# Patient Record
Sex: MALE | Race: BLACK OR AFRICAN AMERICAN | Employment: FULL TIME | URBAN - METROPOLITAN AREA
[De-identification: names, ages, dates, MRNs, and addresses within clinical notes are randomized per-mention and may not be internally consistent; named-entity substitution may affect disease eponyms.]

---

## 2017-11-09 ENCOUNTER — TELEPHONE (OUTPATIENT)
Dept: INTERNAL MEDICINE CLINIC | Age: 27
End: 2017-11-09

## 2017-11-09 ENCOUNTER — TELEPHONE (OUTPATIENT)
Dept: FAMILY MEDICINE CLINIC | Age: 27
End: 2017-11-09

## 2017-11-09 NOTE — TELEPHONE ENCOUNTER
The patient is scheduled with Jaelyn Velazquez 11/14/17, he called and wanted to be seen today. He was offered an appt at 2:15 today at [facility] office and declined because of transportation issues. He then asked for an appt here this morning. He was offered an 11:30 with 11:00 arrival time today per Dr. Johnny Obrien and he said he couldn't make that either. He was given Denice's office # to call and see if he could get in sooner or maybe be called if there were any cancellations. Again, he was offered 2 appts today and declined both.

## 2017-11-09 NOTE — TELEPHONE ENCOUNTER
Pt notified that he would not be able to be seen in the office. Recommended that he go to the [facility]. Pt was given the information for the [facility].

## 2017-11-14 ENCOUNTER — OFFICE VISIT (OUTPATIENT)
Dept: INTERNAL MEDICINE CLINIC | Age: 27
End: 2017-11-14

## 2017-11-14 VITALS
BODY MASS INDEX: 23.49 KG/M2 | HEART RATE: 60 BPM | DIASTOLIC BLOOD PRESSURE: 78 MMHG | RESPIRATION RATE: 16 BRPM | HEIGHT: 71 IN | OXYGEN SATURATION: 98 % | SYSTOLIC BLOOD PRESSURE: 120 MMHG | WEIGHT: 167.8 LBS

## 2017-11-14 DIAGNOSIS — M54.42 CHRONIC BILATERAL LOW BACK PAIN WITH LEFT-SIDED SCIATICA: Primary | ICD-10-CM

## 2017-11-14 DIAGNOSIS — Z76.89 ENCOUNTER TO ESTABLISH CARE: ICD-10-CM

## 2017-11-14 DIAGNOSIS — Z11.4 SCREENING FOR HIV WITHOUT PRESENCE OF RISK FACTORS: ICD-10-CM

## 2017-11-14 DIAGNOSIS — Z13.0 SCREENING, ANEMIA, DEFICIENCY, IRON: ICD-10-CM

## 2017-11-14 DIAGNOSIS — Z13.29 SCREENING FOR THYROID DISORDER: ICD-10-CM

## 2017-11-14 DIAGNOSIS — G89.29 CHRONIC BILATERAL LOW BACK PAIN WITH LEFT-SIDED SCIATICA: Primary | ICD-10-CM

## 2017-11-14 DIAGNOSIS — Z13.228 SCREENING FOR METABOLIC DISORDER: ICD-10-CM

## 2017-11-14 PROCEDURE — 36415 COLL VENOUS BLD VENIPUNCTURE: CPT | Performed by: NURSE PRACTITIONER

## 2017-11-14 PROCEDURE — 96127 BRIEF EMOTIONAL/BEHAV ASSMT: CPT | Performed by: NURSE PRACTITIONER

## 2017-11-14 PROCEDURE — 99203 OFFICE O/P NEW LOW 30 MIN: CPT | Performed by: NURSE PRACTITIONER

## 2017-11-14 PROCEDURE — G8484 FLU IMMUNIZE NO ADMIN: HCPCS | Performed by: NURSE PRACTITIONER

## 2017-11-14 PROCEDURE — G8427 DOCREV CUR MEDS BY ELIG CLIN: HCPCS | Performed by: NURSE PRACTITIONER

## 2017-11-14 PROCEDURE — G8420 CALC BMI NORM PARAMETERS: HCPCS | Performed by: NURSE PRACTITIONER

## 2017-11-14 PROCEDURE — 4004F PT TOBACCO SCREEN RCVD TLK: CPT | Performed by: NURSE PRACTITIONER

## 2017-11-14 RX ORDER — TIZANIDINE HYDROCHLORIDE 4 MG/1
4 CAPSULE, GELATIN COATED ORAL 3 TIMES DAILY
Qty: 21 CAPSULE | Refills: 0 | Status: SHIPPED | OUTPATIENT
Start: 2017-11-14 | End: 2017-11-21

## 2017-11-14 RX ORDER — MELOXICAM 7.5 MG/1
7.5 TABLET ORAL DAILY
Qty: 30 TABLET | Refills: 3 | Status: SHIPPED | OUTPATIENT
Start: 2017-11-14 | End: 2018-04-11 | Stop reason: SDUPTHER

## 2017-11-14 ASSESSMENT — ENCOUNTER SYMPTOMS
RHINORRHEA: 1
SINUS PRESSURE: 0
CHEST TIGHTNESS: 0
SINUS PAIN: 0
COUGH: 0
ABDOMINAL PAIN: 0
VOMITING: 0
DIARRHEA: 0
SORE THROAT: 0
SHORTNESS OF BREATH: 0
WHEEZING: 0
CONSTIPATION: 0
BACK PAIN: 1
NAUSEA: 0

## 2017-11-14 ASSESSMENT — PATIENT HEALTH QUESTIONNAIRE - PHQ9
9. THOUGHTS THAT YOU WOULD BE BETTER OFF DEAD, OR OF HURTING YOURSELF: 0
10. IF YOU CHECKED OFF ANY PROBLEMS, HOW DIFFICULT HAVE THESE PROBLEMS MADE IT FOR YOU TO DO YOUR WORK, TAKE CARE OF THINGS AT HOME, OR GET ALONG WITH OTHER PEOPLE: 1
1. LITTLE INTEREST OR PLEASURE IN DOING THINGS: 2
2. FEELING DOWN, DEPRESSED OR HOPELESS: 0
5. POOR APPETITE OR OVEREATING: 3
SUM OF ALL RESPONSES TO PHQ9 QUESTIONS 1 & 2: 2
8. MOVING OR SPEAKING SO SLOWLY THAT OTHER PEOPLE COULD HAVE NOTICED. OR THE OPPOSITE, BEING SO FIGETY OR RESTLESS THAT YOU HAVE BEEN MOVING AROUND A LOT MORE THAN USUAL: 0
6. FEELING BAD ABOUT YOURSELF - OR THAT YOU ARE A FAILURE OR HAVE LET YOURSELF OR YOUR FAMILY DOWN: 0
7. TROUBLE CONCENTRATING ON THINGS, SUCH AS READING THE NEWSPAPER OR WATCHING TELEVISION: 1
3. TROUBLE FALLING OR STAYING ASLEEP: 3
SUM OF ALL RESPONSES TO PHQ QUESTIONS 1-9: 10
4. FEELING TIRED OR HAVING LITTLE ENERGY: 1

## 2017-11-14 NOTE — PROGRESS NOTES
does not have dentures. Normal dentition. Eyes: Conjunctivae, EOM and lids are normal. Pupils are equal, round, and reactive to light. Neck: Trachea normal and normal range of motion. Neck supple. No JVD present. No thyroid mass present. Cardiovascular: Normal rate, regular rhythm, S1 normal, S2 normal, normal heart sounds, intact distal pulses and normal pulses. No murmur heard. Pulmonary/Chest: Effort normal and breath sounds normal. No respiratory distress. Abdominal: Soft. Normal appearance and bowel sounds are normal. There is no tenderness. Musculoskeletal: Normal range of motion. Right shoulder: He exhibits pain. Lymphadenopathy:        Head (right side): No submental, no submandibular, no tonsillar, no preauricular, no posterior auricular and no occipital adenopathy present. Head (left side): No submental, no submandibular, no tonsillar, no preauricular, no posterior auricular and no occipital adenopathy present. Neurological: He is alert and oriented to person, place, and time. No cranial nerve deficit. Skin: Skin is warm, dry and intact. No pallor. Psychiatric: He has a normal mood and affect.  His speech is normal and behavior is normal. Judgment normal. Cognition and memory are normal.       Lab Results   Component Value Date    WBC 6.0 12/01/2017    WBC 6.3 10/16/2015    WBC 12.7 10/11/2015    NEUTROABS 3.2 12/01/2017    NEUTROABS 3.6 10/16/2015    HGB 16.4 12/01/2017    HGB 17.2 10/16/2015    HGB 18.8 10/11/2015    HCT 48.3 12/01/2017    HCT 52.6 10/16/2015    HCT 57.0 10/11/2015    MCV 98.1 12/01/2017    .6 10/16/2015    MCV 98.4 10/11/2015     12/01/2017     10/16/2015     10/11/2015    SEGSABS 10.3 10/11/2015    SEGSABS 3.8 12/04/2012    SEGSABS 3.4 09/24/2011    LYMPHSABS 1.9 12/01/2017    MONOSABS 0.6 12/01/2017    EOSABS 0.4 12/01/2017    BASOSABS 0.0 12/01/2017     Lab Results   Component Value Date    TSH 1.01 12/01/2017     Lab

## 2017-12-01 LAB
A/G RATIO: 2.2 (ref 1.1–2.2)
ALBUMIN SERPL-MCNC: 5 G/DL (ref 3.4–5)
ALP BLD-CCNC: 46 U/L (ref 40–129)
ALT SERPL-CCNC: 8 U/L (ref 10–40)
ANION GAP SERPL CALCULATED.3IONS-SCNC: 17 MMOL/L (ref 3–16)
AST SERPL-CCNC: 15 U/L (ref 15–37)
BASOPHILS ABSOLUTE: 0 K/UL (ref 0–0.2)
BASOPHILS RELATIVE PERCENT: 0.7 %
BILIRUB SERPL-MCNC: 0.5 MG/DL (ref 0–1)
BUN BLDV-MCNC: 12 MG/DL (ref 7–20)
CALCIUM SERPL-MCNC: 9.9 MG/DL (ref 8.3–10.6)
CHLORIDE BLD-SCNC: 100 MMOL/L (ref 99–110)
CO2: 24 MMOL/L (ref 21–32)
CREAT SERPL-MCNC: 1 MG/DL (ref 0.9–1.3)
EOSINOPHILS ABSOLUTE: 0.4 K/UL (ref 0–0.6)
EOSINOPHILS RELATIVE PERCENT: 6.6 %
GFR AFRICAN AMERICAN: >60
GFR NON-AFRICAN AMERICAN: >60
GLOBULIN: 2.3 G/DL
GLUCOSE BLD-MCNC: 94 MG/DL (ref 70–99)
HCT VFR BLD CALC: 48.3 % (ref 40.5–52.5)
HEMOGLOBIN: 16.4 G/DL (ref 13.5–17.5)
LYMPHOCYTES ABSOLUTE: 1.9 K/UL (ref 1–5.1)
LYMPHOCYTES RELATIVE PERCENT: 30.8 %
MCH RBC QN AUTO: 33.3 PG (ref 26–34)
MCHC RBC AUTO-ENTMCNC: 33.9 G/DL (ref 31–36)
MCV RBC AUTO: 98.1 FL (ref 80–100)
MONOCYTES ABSOLUTE: 0.6 K/UL (ref 0–1.3)
MONOCYTES RELATIVE PERCENT: 9.4 %
NEUTROPHILS ABSOLUTE: 3.2 K/UL (ref 1.7–7.7)
NEUTROPHILS RELATIVE PERCENT: 52.5 %
PDW BLD-RTO: 12.8 % (ref 12.4–15.4)
PLATELET # BLD: 199 K/UL (ref 135–450)
PMV BLD AUTO: 10 FL (ref 5–10.5)
POTASSIUM SERPL-SCNC: 4.4 MMOL/L (ref 3.5–5.1)
RBC # BLD: 4.93 M/UL (ref 4.2–5.9)
SODIUM BLD-SCNC: 141 MMOL/L (ref 136–145)
T4 FREE: 1.3 NG/DL (ref 0.9–1.8)
TOTAL PROTEIN: 7.3 G/DL (ref 6.4–8.2)
TSH SERPL DL<=0.05 MIU/L-ACNC: 1.01 UIU/ML (ref 0.27–4.2)
WBC # BLD: 6 K/UL (ref 4–11)

## 2017-12-04 LAB — HIV-1 AND HIV-2 ANTIBODIES: NORMAL

## 2017-12-12 ENCOUNTER — OFFICE VISIT (OUTPATIENT)
Dept: INTERNAL MEDICINE CLINIC | Age: 27
End: 2017-12-12

## 2017-12-12 VITALS
HEART RATE: 76 BPM | OXYGEN SATURATION: 99 % | DIASTOLIC BLOOD PRESSURE: 70 MMHG | RESPIRATION RATE: 14 BRPM | BODY MASS INDEX: 23.3 KG/M2 | HEIGHT: 72 IN | WEIGHT: 172 LBS | SYSTOLIC BLOOD PRESSURE: 110 MMHG

## 2017-12-12 DIAGNOSIS — B07.9 VIRAL WARTS, UNSPECIFIED TYPE: ICD-10-CM

## 2017-12-12 DIAGNOSIS — Z23 NEED FOR TDAP VACCINATION: ICD-10-CM

## 2017-12-12 DIAGNOSIS — Z72.0 TOBACCO ABUSE: Primary | ICD-10-CM

## 2017-12-12 PROCEDURE — 4004F PT TOBACCO SCREEN RCVD TLK: CPT | Performed by: NURSE PRACTITIONER

## 2017-12-12 PROCEDURE — 99213 OFFICE O/P EST LOW 20 MIN: CPT | Performed by: NURSE PRACTITIONER

## 2017-12-12 PROCEDURE — G8427 DOCREV CUR MEDS BY ELIG CLIN: HCPCS | Performed by: NURSE PRACTITIONER

## 2017-12-12 PROCEDURE — G8484 FLU IMMUNIZE NO ADMIN: HCPCS | Performed by: NURSE PRACTITIONER

## 2017-12-12 PROCEDURE — G8420 CALC BMI NORM PARAMETERS: HCPCS | Performed by: NURSE PRACTITIONER

## 2017-12-12 RX ORDER — VARENICLINE TARTRATE 25 MG
KIT ORAL
Qty: 1 EACH | Refills: 0 | Status: SHIPPED | OUTPATIENT
Start: 2017-12-12 | End: 2018-04-11 | Stop reason: DRUGHIGH

## 2017-12-12 NOTE — PROGRESS NOTES
Myra Duane is a 33 y.o. male who presents today with   Chief Complaint   Patient presents with    Nicotine Dependence    Verruca Vulgaris    Immunizations       /70 (Site: Right Arm, Position: Sitting, Cuff Size: Medium Adult)   Pulse 76   Resp 14   Ht 6' (1.829 m)   Wt 172 lb (78 kg)   SpO2 99%   BMI 23.33 kg/m²      SUBJECTIVE:    HPI     Last seen 1 month ago. Has not been to physical therapy for his back pain yet. No medical encounter since his last visit. The patient denies cough, chest pain, dyspnea, wheezing or hemoptysis. He is smoking a half a pack a cigarettes every day and is willing to quit smoking. His only somatic complaint today are multiple warts on his hands. Past Medical History:   Diagnosis Date    Allergic rhinitis     Tobacco abuse 1/19/2018    Ulcer ([address] 2011     Past Surgical History:   Procedure Laterality Date    ABDOMEN SURGERY  2011    ulcer     Family History   Problem Relation Age of Onset    No Known Problems Mother     No Known Problems Brother     No Known Problems Other     No Known Problems Father     High Blood Pressure Maternal Grandfather      Social History   Substance Use Topics    Smoking status: Current Every Day Smoker     Packs/day: 0.50     Types: Cigars    Smokeless tobacco: Never Used    Alcohol use Yes      Comment: one pedro harding     Outpatient Prescriptions Marked as Taking for the 12/12/17 encounter (Office Visit) with Malick Grace CNP   Medication Sig Dispense Refill    varenicline (CHANTIX STARTING MONTH PAK) 0.5 MG X 11 & 1 MG X 42 tablet Take by mouth. 1 each 0    meloxicam (MOBIC) 7.5 MG tablet Take 1 tablet by mouth daily 30 tablet 3       Review of Systems   Respiratory: Negative for cough, shortness of breath and wheezing. Cardiovascular: Negative for chest pain and palpitations. Gastrointestinal: Negative for diarrhea, nausea and vomiting. Skin: Positive for wound.         Multiple warts OBJECTIVE:      Physical Exam   Constitutional: He is oriented to person, place, and time. He appears well-developed and well-nourished. Lab and pertinent imaging results reviewed   HENT:   Head: Normocephalic. Cardiovascular: Normal rate, regular rhythm, S1 normal, S2 normal and normal heart sounds. No extrasystoles are present. Exam reveals no gallop, no S3, no S4 and no friction rub. No murmur heard. Pulses:       Radial pulses are 2+ on the right side, and 2+ on the left side. Pulmonary/Chest: Effort normal and breath sounds normal.   Abdominal: Soft. Musculoskeletal: Normal range of motion. Neurological: He is alert and oriented to person, place, and time. Skin: Skin is warm and dry. Psychiatric: He has a normal mood and affect. His speech is normal and behavior is normal.   Vitals reviewed. No results found for requested labs within last 30 days. Lab Results   Component Value Date    WBC 6.0 12/01/2017    WBC 6.3 10/16/2015    WBC 12.7 10/11/2015    NEUTROABS 3.2 12/01/2017    NEUTROABS 3.6 10/16/2015    HGB 16.4 12/01/2017    HGB 17.2 10/16/2015    HGB 18.8 10/11/2015    HCT 48.3 12/01/2017    HCT 52.6 10/16/2015    HCT 57.0 10/11/2015    MCV 98.1 12/01/2017    .6 10/16/2015    MCV 98.4 10/11/2015     12/01/2017     10/16/2015     10/11/2015    SEGSABS 10.3 10/11/2015    SEGSABS 3.8 12/04/2012    SEGSABS 3.4 09/24/2011    LYMPHSABS 1.9 12/01/2017    MONOSABS 0.6 12/01/2017    EOSABS 0.4 12/01/2017    BASOSABS 0.0 12/01/2017     Lab Results   Component Value Date    TSH 1.01 12/01/2017     Lab Results   Component Value Date    LABALBU 5.0 12/01/2017    BILITOT 0.5 12/01/2017    BILIDIR 0.2 12/04/2012    IBILI 0.5 12/04/2012    AST 15 12/01/2017    ALT 8 12/01/2017    ALKPHOS 46 12/01/2017        Controlled Substances Monitoring:  not receiving controlled substances    ASSESSMENT AND PLAN:     1.  Tobacco abuse  · Start varenicline (CHANTIX

## 2017-12-29 ENCOUNTER — HOSPITAL ENCOUNTER (OUTPATIENT)
Dept: PHYSICAL THERAPY | Age: 27
Discharge: HOME | End: 2017-12-29

## 2018-01-09 PROBLEM — G89.29 CHRONIC BILATERAL LOW BACK PAIN WITH LEFT-SIDED SCIATICA: Status: ACTIVE | Noted: 2018-01-09

## 2018-01-09 PROBLEM — M54.42 CHRONIC BILATERAL LOW BACK PAIN WITH LEFT-SIDED SCIATICA: Status: ACTIVE | Noted: 2018-01-09

## 2018-01-19 PROBLEM — B07.9 VIRAL WARTS: Status: ACTIVE | Noted: 2018-01-19

## 2018-01-19 PROBLEM — Z72.0 TOBACCO ABUSE: Status: ACTIVE | Noted: 2018-01-19

## 2018-01-19 ASSESSMENT — ENCOUNTER SYMPTOMS
COUGH: 0
SHORTNESS OF BREATH: 0
DIARRHEA: 0
WHEEZING: 0
NAUSEA: 0
VOMITING: 0

## 2018-04-11 ENCOUNTER — OFFICE VISIT (OUTPATIENT)
Dept: INTERNAL MEDICINE CLINIC | Age: 28
End: 2018-04-11

## 2018-04-11 VITALS
RESPIRATION RATE: 20 BRPM | HEART RATE: 65 BPM | SYSTOLIC BLOOD PRESSURE: 114 MMHG | WEIGHT: 169.6 LBS | HEIGHT: 72 IN | BODY MASS INDEX: 22.97 KG/M2 | DIASTOLIC BLOOD PRESSURE: 76 MMHG | OXYGEN SATURATION: 99 %

## 2018-04-11 DIAGNOSIS — Z23 NEED FOR TDAP VACCINATION: ICD-10-CM

## 2018-04-11 DIAGNOSIS — Z72.0 TOBACCO ABUSE: Primary | ICD-10-CM

## 2018-04-11 DIAGNOSIS — B07.9 VIRAL WARTS, UNSPECIFIED TYPE: ICD-10-CM

## 2018-04-11 DIAGNOSIS — M54.42 CHRONIC BILATERAL LOW BACK PAIN WITH LEFT-SIDED SCIATICA: ICD-10-CM

## 2018-04-11 DIAGNOSIS — G89.29 CHRONIC BILATERAL LOW BACK PAIN WITH LEFT-SIDED SCIATICA: ICD-10-CM

## 2018-04-11 PROCEDURE — 90715 TDAP VACCINE 7 YRS/> IM: CPT | Performed by: NURSE PRACTITIONER

## 2018-04-11 PROCEDURE — 99213 OFFICE O/P EST LOW 20 MIN: CPT | Performed by: NURSE PRACTITIONER

## 2018-04-11 PROCEDURE — 90471 IMMUNIZATION ADMIN: CPT | Performed by: NURSE PRACTITIONER

## 2018-04-11 PROCEDURE — 4004F PT TOBACCO SCREEN RCVD TLK: CPT | Performed by: NURSE PRACTITIONER

## 2018-04-11 PROCEDURE — G8427 DOCREV CUR MEDS BY ELIG CLIN: HCPCS | Performed by: NURSE PRACTITIONER

## 2018-04-11 PROCEDURE — G8420 CALC BMI NORM PARAMETERS: HCPCS | Performed by: NURSE PRACTITIONER

## 2018-04-11 RX ORDER — IBUPROFEN 600 MG/1
600 TABLET ORAL EVERY 8 HOURS PRN
Qty: 30 TABLET | Refills: 0 | Status: CANCELLED | OUTPATIENT
Start: 2018-04-11

## 2018-04-11 RX ORDER — VARENICLINE TARTRATE 25 MG
KIT ORAL
Qty: 1 EACH | Refills: 0 | Status: CANCELLED | OUTPATIENT
Start: 2018-04-11

## 2018-04-11 RX ORDER — MELOXICAM 7.5 MG/1
7.5 TABLET ORAL DAILY
Qty: 30 TABLET | Refills: 3 | Status: SHIPPED | OUTPATIENT
Start: 2018-04-11 | End: 2019-06-30

## 2018-04-11 RX ORDER — VARENICLINE TARTRATE 1 MG/1
1 TABLET, FILM COATED ORAL 2 TIMES DAILY
Qty: 60 TABLET | Refills: 1 | Status: SHIPPED | OUTPATIENT
Start: 2018-04-11 | End: 2019-06-30

## 2018-05-09 ENCOUNTER — HOSPITAL ENCOUNTER (OUTPATIENT)
Dept: PHYSICAL THERAPY | Age: 28
Discharge: HOME | End: 2018-05-31
Attending: NURSE PRACTITIONER | Admitting: NURSE PRACTITIONER

## 2018-05-09 ASSESSMENT — PAIN SCALES - GENERAL: PAINLEVEL_OUTOF10: 0

## 2018-05-09 ASSESSMENT — PAIN DESCRIPTION - LOCATION: LOCATION: BACK

## 2018-05-09 ASSESSMENT — PAIN DESCRIPTION - DESCRIPTORS: DESCRIPTORS: ACHING

## 2018-05-09 ASSESSMENT — PAIN DESCRIPTION - PAIN TYPE: TYPE: CHRONIC PAIN

## 2018-05-09 ASSESSMENT — PAIN DESCRIPTION - FREQUENCY: FREQUENCY: INTERMITTENT

## 2018-05-09 ASSESSMENT — PAIN DESCRIPTION - PROGRESSION: CLINICAL_PROGRESSION: GRADUALLY WORSENING

## 2018-05-11 ENCOUNTER — HOSPITAL ENCOUNTER (OUTPATIENT)
Dept: PHYSICAL THERAPY | Age: 28
Discharge: HOME | End: 2018-05-11
Admitting: NURSE PRACTITIONER

## 2018-05-15 ENCOUNTER — HOSPITAL ENCOUNTER (OUTPATIENT)
Dept: PHYSICAL THERAPY | Age: 28
Discharge: HOME | End: 2018-05-15
Admitting: NURSE PRACTITIONER

## 2018-06-01 ENCOUNTER — HOSPITAL ENCOUNTER (OUTPATIENT)
Dept: OTHER | Age: 28
Discharge: HOME | End: 2018-06-30
Attending: NURSE PRACTITIONER | Admitting: NURSE PRACTITIONER

## 2018-09-11 ENCOUNTER — INITIAL CONSULT (OUTPATIENT)
Dept: CARDIOLOGY CLINIC | Age: 28
End: 2018-09-11

## 2018-09-11 ENCOUNTER — NURSE ONLY (OUTPATIENT)
Dept: CARDIOLOGY CLINIC | Age: 28
End: 2018-09-11

## 2018-09-11 ENCOUNTER — OFFICE VISIT (OUTPATIENT)
Dept: INTERNAL MEDICINE CLINIC | Age: 28
End: 2018-09-11

## 2018-09-11 VITALS
BODY MASS INDEX: 23.03 KG/M2 | HEART RATE: 56 BPM | DIASTOLIC BLOOD PRESSURE: 60 MMHG | WEIGHT: 170 LBS | HEIGHT: 72 IN | SYSTOLIC BLOOD PRESSURE: 122 MMHG

## 2018-09-11 VITALS
HEART RATE: 60 BPM | OXYGEN SATURATION: 99 % | HEIGHT: 72 IN | BODY MASS INDEX: 22.75 KG/M2 | DIASTOLIC BLOOD PRESSURE: 70 MMHG | SYSTOLIC BLOOD PRESSURE: 118 MMHG | WEIGHT: 168 LBS | RESPIRATION RATE: 14 BRPM

## 2018-09-11 DIAGNOSIS — R55 SYNCOPE, UNSPECIFIED SYNCOPE TYPE: Primary | ICD-10-CM

## 2018-09-11 DIAGNOSIS — Z72.0 TOBACCO ABUSE: ICD-10-CM

## 2018-09-11 DIAGNOSIS — Z00.00 ANNUAL PHYSICAL EXAM: ICD-10-CM

## 2018-09-11 DIAGNOSIS — R20.0 RIGHT ARM NUMBNESS: ICD-10-CM

## 2018-09-11 LAB
A/G RATIO: 2 (ref 1.1–2.2)
ALBUMIN SERPL-MCNC: 4.9 G/DL (ref 3.4–5)
ALP BLD-CCNC: 48 U/L (ref 40–129)
ALT SERPL-CCNC: 7 U/L (ref 10–40)
ANION GAP SERPL CALCULATED.3IONS-SCNC: 15 MMOL/L (ref 3–16)
AST SERPL-CCNC: 13 U/L (ref 15–37)
BASOPHILS ABSOLUTE: 0.1 K/UL (ref 0–0.2)
BASOPHILS RELATIVE PERCENT: 0.9 %
BILIRUB SERPL-MCNC: 0.5 MG/DL (ref 0–1)
BUN BLDV-MCNC: 9 MG/DL (ref 7–20)
CALCIUM SERPL-MCNC: 9.3 MG/DL (ref 8.3–10.6)
CHLORIDE BLD-SCNC: 98 MMOL/L (ref 99–110)
CO2: 24 MMOL/L (ref 21–32)
CREAT SERPL-MCNC: 0.9 MG/DL (ref 0.9–1.3)
EOSINOPHILS ABSOLUTE: 0.2 K/UL (ref 0–0.6)
EOSINOPHILS RELATIVE PERCENT: 4 %
GFR AFRICAN AMERICAN: >60
GFR NON-AFRICAN AMERICAN: >60
GLOBULIN: 2.4 G/DL
GLUCOSE BLD-MCNC: 88 MG/DL (ref 70–99)
HCT VFR BLD CALC: 47.8 % (ref 40.5–52.5)
HEMOGLOBIN: 15.9 G/DL (ref 13.5–17.5)
LYMPHOCYTES ABSOLUTE: 2.2 K/UL (ref 1–5.1)
LYMPHOCYTES RELATIVE PERCENT: 37.1 %
MCH RBC QN AUTO: 32.7 PG (ref 26–34)
MCHC RBC AUTO-ENTMCNC: 33.4 G/DL (ref 31–36)
MCV RBC AUTO: 97.9 FL (ref 80–100)
MONOCYTES ABSOLUTE: 0.7 K/UL (ref 0–1.3)
MONOCYTES RELATIVE PERCENT: 11.2 %
NEUTROPHILS ABSOLUTE: 2.8 K/UL (ref 1.7–7.7)
NEUTROPHILS RELATIVE PERCENT: 46.8 %
PDW BLD-RTO: 14.1 % (ref 12.4–15.4)
PLATELET # BLD: 216 K/UL (ref 135–450)
PMV BLD AUTO: 10 FL (ref 5–10.5)
POTASSIUM SERPL-SCNC: 4.6 MMOL/L (ref 3.5–5.1)
RBC # BLD: 4.88 M/UL (ref 4.2–5.9)
SODIUM BLD-SCNC: 137 MMOL/L (ref 136–145)
T4 FREE: 1.4 NG/DL (ref 0.9–1.8)
TOTAL PROTEIN: 7.3 G/DL (ref 6.4–8.2)
TSH SERPL DL<=0.05 MIU/L-ACNC: 1.47 UIU/ML (ref 0.27–4.2)
WBC # BLD: 5.9 K/UL (ref 4–11)

## 2018-09-11 PROCEDURE — 93000 ELECTROCARDIOGRAM COMPLETE: CPT | Performed by: INTERNAL MEDICINE

## 2018-09-11 PROCEDURE — 99203 OFFICE O/P NEW LOW 30 MIN: CPT | Performed by: INTERNAL MEDICINE

## 2018-09-11 PROCEDURE — 4004F PT TOBACCO SCREEN RCVD TLK: CPT | Performed by: NURSE PRACTITIONER

## 2018-09-11 PROCEDURE — G8427 DOCREV CUR MEDS BY ELIG CLIN: HCPCS | Performed by: NURSE PRACTITIONER

## 2018-09-11 PROCEDURE — G8420 CALC BMI NORM PARAMETERS: HCPCS | Performed by: NURSE PRACTITIONER

## 2018-09-11 PROCEDURE — G8427 DOCREV CUR MEDS BY ELIG CLIN: HCPCS | Performed by: INTERNAL MEDICINE

## 2018-09-11 PROCEDURE — G8420 CALC BMI NORM PARAMETERS: HCPCS | Performed by: INTERNAL MEDICINE

## 2018-09-11 PROCEDURE — 99214 OFFICE O/P EST MOD 30 MIN: CPT | Performed by: NURSE PRACTITIONER

## 2018-09-11 NOTE — PROGRESS NOTES
24 hour holter monitor [employer] for syncope Serial # [id number] . Instructed patient on monitor and proper use. Instructed on diary. When to remove and bring it back. Must leave the holter monitor on  without removing for the duration of time ordered. Answered all questions the patient had. Instructed patient to call [facility] at [phone] with any questions or concerns with the monitor.

## 2018-09-11 NOTE — PROGRESS NOTES
CARDIOLOGY CONSULT NOTE   Reason for consultation:  syncope    Referring physician: Juanpablo Smoker    Primary care physician: Juanpablo Smoker, APRN - CNP      Dear Serenity Galvez   Thanks for the consult. History of present illness:Presley is a 33 y. o.year old who  presents with syncope x 2, last time was one month and it happed 2013 for the 1st time, both times while urinating, he felt light headed and then fell down and woke up on floor, it happed at home both time, he denied any drugs, but used marijuana the last time he had syncope, no chest pain, no palpitations. no dizziness, no seizure like activity, sdid not have any bowel or urine incontinence and got up quickly, no confusion  He has been using marijuana for many yrs, he does not work as he had rt elbow pain, its 4/10, continous pain, gets better with stretching,non radiating, its sharp pain  Blood pressure, cholesterol, blood glucose and weight are well controlled. Past medical history:    has a past medical history of Allergic rhinitis; Chronic bilateral low back pain with left-sided sciatica; Tobacco abuse; and Ulcer. Past surgical history:   has a past surgical history that includes Abdomen surgery (2011). Social History:   reports that he has been smoking Cigars. He has been smoking about 0.50 packs per day. He has never used smokeless tobacco. He reports that he drinks alcohol. He reports that he uses drugs, including Marijuana. Family history:   no family history of CAD, STROKE of DM    No Known Allergies      No current facility-administered medications for this visit.    Current Outpatient Prescriptions   Medication Sig Dispense Refill    meloxicam (MOBIC) 7.5 MG tablet Take 1 tablet by mouth daily 30 tablet 3    ibuprofen (ADVIL;MOTRIN) 600 MG tablet Take 1 tablet by mouth every 8 hours as needed for Pain 30 tablet 0    varenicline (CHANTIX CONTINUING MONTH NILSA) 1 MG tablet Take 1 tablet by mouth 2 times daily 60 tablet 1     No current facility-administered medications for this visit. Review of Systems:   · Constitutional: No Fever or Weight Loss   · Eyes: No Decreased Vision  · ENT: No Headaches, Hearing Loss or Vertigo  · Cardiovascular: No chest pain, dyspnea on exertion, palpitations or loss of consciousness  · Respiratory: No cough or wheezing    · Gastrointestinal: No abdominal pain, appetite loss, blood in stools, constipation, diarrhea or heartburn  · Genitourinary: No dysuria, trouble voiding, or hematuria  · Musculoskeletal:  No gait disturbance, weakness or joint complaints  · Integumentary: No rash or pruritis  · Neurological: No TIA or stroke symptoms  · Psychiatric: No anxiety or depression  · Endocrine: No malaise, fatigue or temperature intolerance  · Hematologic/Lymphatic: No bleeding problems, blood clots or swollen lymph nodes  · Allergic/Immunologic: No nasal congestion or hives  All systems negative except as marked. ·   ·      Physical Examination:    Vitals:    09/11/18 1547   BP: 122/60   Pulse: 56    rr 14  Wt Readings from Last 3 Encounters:   09/11/18 170 lb (77.1 kg)   09/11/18 168 lb (76.2 kg)   06/10/18 170 lb (77.1 kg)     Body mass index is 23.06 kg/m². General Appearance:  No distress, conversant    Constitutional:  Well developed, Well nourished, No acute distress, Non-toxic appearance. HENT:  Normocephalic, Atraumatic, Bilateral external ears normal, Oropharynx moist, No oral exudates, Nose normal. Neck- Normal range of motion, No tenderness, Supple, No stridor,no apical-carotid delay, no carotid bruit  Eyes:  PERRL, EOMI, Conjunctiva normal, No discharge. Respiratory:  Normal breath sounds, No respiratory distress, No wheezing, No chest tenderness. ,no use of accessory muscles, diaphragm movement is normal  Cardiovascular: (PMI) apex non displaced,no lifts no thrills, no s3,no s4, Normal heart rate, Normal rhythm, No murmurs, No rubs, No gallops.  Carotid arteries pulse and amplitude are normal no

## 2018-09-17 ENCOUNTER — OFFICE VISIT (OUTPATIENT)
Dept: ORTHOPEDIC SURGERY | Age: 28
End: 2018-09-17

## 2018-09-17 VITALS — BODY MASS INDEX: 23.03 KG/M2 | WEIGHT: 170 LBS | HEIGHT: 72 IN | RESPIRATION RATE: 16 BRPM

## 2018-09-17 DIAGNOSIS — G56.21 CUBITAL TUNNEL SYNDROME ON RIGHT: Primary | ICD-10-CM

## 2018-09-17 DIAGNOSIS — R52 PAIN: ICD-10-CM

## 2018-09-17 DIAGNOSIS — M24.121: ICD-10-CM

## 2018-09-17 PROCEDURE — 4004F PT TOBACCO SCREEN RCVD TLK: CPT | Performed by: PHYSICIAN ASSISTANT

## 2018-09-17 PROCEDURE — G8427 DOCREV CUR MEDS BY ELIG CLIN: HCPCS | Performed by: PHYSICIAN ASSISTANT

## 2018-09-17 PROCEDURE — 99213 OFFICE O/P EST LOW 20 MIN: CPT | Performed by: PHYSICIAN ASSISTANT

## 2018-09-17 PROCEDURE — G8420 CALC BMI NORM PARAMETERS: HCPCS | Performed by: PHYSICIAN ASSISTANT

## 2018-09-17 RX ORDER — NAPROXEN 500 MG/1
500 TABLET ORAL 2 TIMES DAILY WITH MEALS
Qty: 60 TABLET | Refills: 1
Start: 2018-09-17 | End: 2019-06-30

## 2018-09-17 ASSESSMENT — ENCOUNTER SYMPTOMS
RESPIRATORY NEGATIVE: 1
GASTROINTESTINAL NEGATIVE: 1
BLURRED VISION: 1

## 2018-09-17 NOTE — PATIENT INSTRUCTIONS
Plan:    The most likely impression, expected course, diagnostic and treatment options were discussed, will proceed with:  EMG right upper extremity  MRI right elbow  Naprosyn prescribed  Follow up after EMG and MRI      We greatly appreciate the referral from Vielka Garcia NP, and will send a copy of this note to her office.

## 2018-09-17 NOTE — PROGRESS NOTES
Review of Systems   Constitutional: Negative. HENT: Negative. Eyes: Positive for blurred vision. Respiratory: Negative. Cardiovascular: Negative. Gastrointestinal: Negative. Genitourinary: Negative. Musculoskeletal: Positive for joint pain. Skin: Negative. Neurological: Negative. Endo/Heme/Allergies: Negative. Psychiatric/Behavioral: Negative. HPI:  Annie Spence is a 33y.o. year old male who complains of Right elbow pain. The Right elbow pain assessment is:   Intensity: 1/10   Location:        medial   Description:    aching, sharp, stabbing, tingling and nagging    The symptoms started 2 years ago, 08/22/16  Cause of injury was  Trauma. Allen Traore, patient was playing football and landed on right elbow after jumping up to catch ball. Patient demonstrates a hyperextension type mechanism while describing mechanism. Previous treatment for this episode has included home exercises. Numbness whole hand radiates up arm . Symptoms improve with rest. The symptoms are worse with activity. The progression of symptoms, overall course: gradually worsening. Prior to this episode, the history is: trauma including stated above.          Past Medical History:   Diagnosis Date    Allergic rhinitis     Chronic bilateral low back pain with left-sided sciatica     Tobacco abuse 1/19/2018    Ulcer 2011       Past Surgical History:   Procedure Laterality Date    ABDOMEN SURGERY  2011    ulcer       Family History   Problem Relation Age of Onset    No Known Problems Mother     No Known Problems Brother     No Known Problems Other     No Known Problems Father     High Blood Pressure Maternal Grandfather        Social History     Social History    Marital status: Single     Spouse name: N/A    Number of children: N/A    Years of education: N/A     Social History Main Topics    Smoking status: Current Some Day Smoker     Packs/day: 0.50     Types: Cigars    Smokeless tobacco: Never Used    Alcohol use Yes      Comment: one pint whiskangy    Drug use: Yes     Types: Marijuana    Sexual activity: Yes     Partners: Female     Other Topics Concern    None     Social History Narrative    None       Current Outpatient Prescriptions   Medication Sig Dispense Refill    meloxicam (MOBIC) 7.5 MG tablet Take 1 tablet by mouth daily 30 tablet 3    ibuprofen (ADVIL;MOTRIN) 600 MG tablet Take 1 tablet by mouth every 8 hours as needed for Pain 30 tablet 0    varenicline (CHANTIX CONTINUING MONTH NILSA) 1 MG tablet Take 1 tablet by mouth 2 times daily 60 tablet 1     No current facility-administered medications for this visit. No Known Allergies    Review of Systems:  See above      Physical Exam:   Resp 16   Ht 6' (1.829 m)   Wt 170 lb (77.1 kg)   BMI 23.06 kg/m²        Gait is Normal.   Gen/Psych: Examination reveals a pleasant individual in no acute distress. The patient is oriented to time, place and person. The patient's mood and affect are appropriate.     Lymph: The lymphatic examination bilaterally reveals all areas to be without enlargement or induration.      Skin intact without lymphadenopathy, discoloration, or abnormal temperature.      Vascular: There is intact, symmetric circulation in both upper extremities. right Arm exam:  Sensation is Diminished in the ulnar nerve distribution of the right hand  Muscular strength is clinically appropriate bilaterally. elbow exam:  -effusion is absent   -No obvious skeletal deformity on physical exam    Range of motion, flexion 150°, extension 0°, supination 90°, pronation 90°. Positive Tinel sign at the cubital tunnel      Outside record review: office notes    Imaging studies:  taken and reviewed  3 views of the right elbow show evidence of prior coronoid process fracture but no acute fractures. Impression:  right cubital tunnel syndrome,  cartilage defect of elbow (possible intra-articular loose body)      Plan:     The

## 2018-09-20 PROCEDURE — 93224 XTRNL ECG REC UP TO 48 HRS: CPT | Performed by: INTERNAL MEDICINE

## 2018-09-21 ENCOUNTER — TELEPHONE (OUTPATIENT)
Dept: CARDIOLOGY CLINIC | Age: 28
End: 2018-09-21

## 2018-09-25 ENCOUNTER — PROCEDURE VISIT (OUTPATIENT)
Dept: CARDIOLOGY CLINIC | Age: 28
End: 2018-09-25
Payer: MEDICAID

## 2018-09-25 ENCOUNTER — PROCEDURE VISIT (OUTPATIENT)
Dept: CARDIOLOGY CLINIC | Age: 28
End: 2018-09-25

## 2018-09-25 VITALS
HEART RATE: 52 BPM | HEIGHT: 72 IN | SYSTOLIC BLOOD PRESSURE: 124 MMHG | WEIGHT: 170 LBS | DIASTOLIC BLOOD PRESSURE: 78 MMHG | BODY MASS INDEX: 23.03 KG/M2

## 2018-09-25 DIAGNOSIS — R55 SYNCOPE, UNSPECIFIED SYNCOPE TYPE: ICD-10-CM

## 2018-09-25 DIAGNOSIS — R55 SYNCOPE, UNSPECIFIED SYNCOPE TYPE: Primary | ICD-10-CM

## 2018-09-25 LAB
LV EF: 58 %
LVEF MODALITY: NORMAL

## 2018-09-25 PROCEDURE — 93306 TTE W/DOPPLER COMPLETE: CPT | Performed by: INTERNAL MEDICINE

## 2018-09-27 ENCOUNTER — HOSPITAL ENCOUNTER (OUTPATIENT)
Dept: MRI IMAGING | Age: 28
Discharge: HOME | End: 2018-09-27
Payer: MEDICAID

## 2018-09-27 DIAGNOSIS — G56.21 CUBITAL TUNNEL SYNDROME ON RIGHT: ICD-10-CM

## 2018-09-27 DIAGNOSIS — R52 PAIN: ICD-10-CM

## 2018-09-27 PROCEDURE — 73221 MRI JOINT UPR EXTREM W/O DYE: CPT

## 2018-09-28 ENCOUNTER — TELEPHONE (OUTPATIENT)
Dept: CARDIOLOGY CLINIC | Age: 28
End: 2018-09-28

## 2018-10-11 ENCOUNTER — OFFICE VISIT (OUTPATIENT)
Dept: PHYSICAL MEDICINE AND REHAB | Age: 28
End: 2018-10-11
Payer: MEDICAID

## 2018-10-11 DIAGNOSIS — R20.2 PARESTHESIA AND PAIN OF BOTH UPPER EXTREMITIES: ICD-10-CM

## 2018-10-11 DIAGNOSIS — M79.601 PARESTHESIA AND PAIN OF BOTH UPPER EXTREMITIES: ICD-10-CM

## 2018-10-11 DIAGNOSIS — M79.602 PARESTHESIA AND PAIN OF BOTH UPPER EXTREMITIES: ICD-10-CM

## 2018-10-11 DIAGNOSIS — G56.21 ULNAR NEUROPATHY AT ELBOW, RIGHT: Primary | ICD-10-CM

## 2018-10-11 PROCEDURE — 95886 MUSC TEST DONE W/N TEST COMP: CPT | Performed by: PHYSICAL MEDICINE & REHABILITATION

## 2018-10-11 PROCEDURE — 95909 NRV CNDJ TST 5-6 STUDIES: CPT | Performed by: PHYSICAL MEDICINE & REHABILITATION

## 2018-10-16 ENCOUNTER — OFFICE VISIT (OUTPATIENT)
Dept: ORTHOPEDIC SURGERY | Age: 28
End: 2018-10-16
Payer: MEDICAID

## 2018-10-16 VITALS
WEIGHT: 170 LBS | BODY MASS INDEX: 23.03 KG/M2 | HEART RATE: 68 BPM | RESPIRATION RATE: 16 BRPM | OXYGEN SATURATION: 96 % | HEIGHT: 72 IN

## 2018-10-16 DIAGNOSIS — G56.22 ENTRAPMENT OF LEFT ULNAR NERVE AT ELBOW: Primary | ICD-10-CM

## 2018-10-16 PROCEDURE — G8484 FLU IMMUNIZE NO ADMIN: HCPCS | Performed by: PHYSICIAN ASSISTANT

## 2018-10-16 PROCEDURE — G8427 DOCREV CUR MEDS BY ELIG CLIN: HCPCS | Performed by: PHYSICIAN ASSISTANT

## 2018-10-16 PROCEDURE — 99212 OFFICE O/P EST SF 10 MIN: CPT | Performed by: PHYSICIAN ASSISTANT

## 2018-10-16 PROCEDURE — 4004F PT TOBACCO SCREEN RCVD TLK: CPT | Performed by: PHYSICIAN ASSISTANT

## 2018-10-16 PROCEDURE — G8420 CALC BMI NORM PARAMETERS: HCPCS | Performed by: PHYSICIAN ASSISTANT

## 2018-10-16 ASSESSMENT — ENCOUNTER SYMPTOMS
RESPIRATORY NEGATIVE: 1
GASTROINTESTINAL NEGATIVE: 1
BLURRED VISION: 1

## 2018-10-16 NOTE — PROGRESS NOTES
Review of Systems   Constitutional: Negative. HENT: Negative. Eyes: Positive for blurred vision. Respiratory: Negative. Cardiovascular: Negative. Gastrointestinal: Negative. Genitourinary: Negative. Musculoskeletal: Positive for joint pain. Skin: Negative. Neurological: Negative. Endo/Heme/Allergies: Negative. Psychiatric/Behavioral: Negative. HPI:  Trae Joya is a 33y.o. year old male who is here following up on his MRI of his right elbow, and EMG. He states that he is not having any relief and it still bothers him. He states that when he flexes his elbow and leave it there for a few minutes he will have numbness and tingling that shoots into his right hand specifically pinky and ring finger. He feels like this has been going on for the last 2-3 years ever since he injured his elbow. Symptoms improve with rest. The symptoms are worse with activity. The progression of symptoms, overall course: gradually worsening. Prior to this episode, the history is: trauma including stated above.          Past Medical History:   Diagnosis Date    Allergic rhinitis     Chronic bilateral low back pain with left-sided sciatica     H/O exercise stress test 09/25/2018    treadmill    History of Holter monitoring 09/20/2018    normal    Tobacco abuse 1/19/2018    Ulcer 2011       Past Surgical History:   Procedure Laterality Date    ABDOMEN SURGERY  2011    ulcer       Family History   Problem Relation Age of Onset    No Known Problems Mother     No Known Problems Brother     No Known Problems Other     No Known Problems Father     High Blood Pressure Maternal Grandfather        Social History     Social History    Marital status: Single     Spouse name: N/A    Number of children: N/A    Years of education: N/A     Social History Main Topics    Smoking status: Current Some Day Smoker     Packs/day: 0.50     Types: Cigars    Smokeless tobacco: Never Used    Alcohol
cortical fragment appears to be arising from the capitellum best demonstrated   image number 17 of the coronal sequence and image number 13 of the sagittal   sequence.  This does appear to correlate a small cortical fracture fragment   on remote CT imaging.  Reactive appearing edema at the level of the   osteochondral origin.       SOFT TISSUES:  Periarticular soft tissues are unremarkable.           Impression   1. Small 3-4 mm remote appearing cortical fragment arising from the   capitellum does appear to correlate previous CT findings of a small fracture. Reactive appearing edema at the level of the origin of the fragment. 2. Common flexor and extensor tendinosis with sequela of remote epicondylitis.             EMG 10/11/18  IMPRESSION:                  1. Abnormal EMG. There is a mild, but significant, ulnar neuropathy at the right elbow (posttraumatic).                2. Mild ulnar neuropathy at the left elbow as well.                 3. No evidence of a focal cervical spinal nerve root lesion (radiculopathy), plexopathy, generalized neuropathy or primary muscle disease.

## 2018-10-25 ENCOUNTER — TELEPHONE (OUTPATIENT)
Dept: CARDIOLOGY CLINIC | Age: 28
End: 2018-10-25

## 2018-10-30 ENCOUNTER — OFFICE VISIT (OUTPATIENT)
Dept: ORTHOPEDIC SURGERY | Age: 28
End: 2018-10-30
Payer: MEDICAID

## 2018-10-30 VITALS — HEIGHT: 72 IN | WEIGHT: 173 LBS | BODY MASS INDEX: 23.43 KG/M2 | RESPIRATION RATE: 14 BRPM

## 2018-10-30 DIAGNOSIS — G56.21 CUBITAL TUNNEL SYNDROME ON RIGHT: Primary | ICD-10-CM

## 2018-10-30 DIAGNOSIS — G56.21 ENTRAPMENT OF RIGHT ULNAR NERVE AT ELBOW: ICD-10-CM

## 2018-10-30 PROCEDURE — G8484 FLU IMMUNIZE NO ADMIN: HCPCS | Performed by: ORTHOPAEDIC SURGERY

## 2018-10-30 PROCEDURE — G8427 DOCREV CUR MEDS BY ELIG CLIN: HCPCS | Performed by: ORTHOPAEDIC SURGERY

## 2018-10-30 PROCEDURE — 4004F PT TOBACCO SCREEN RCVD TLK: CPT | Performed by: ORTHOPAEDIC SURGERY

## 2018-10-30 PROCEDURE — G8420 CALC BMI NORM PARAMETERS: HCPCS | Performed by: ORTHOPAEDIC SURGERY

## 2018-10-30 PROCEDURE — 99213 OFFICE O/P EST LOW 20 MIN: CPT | Performed by: ORTHOPAEDIC SURGERY

## 2018-10-30 ASSESSMENT — ENCOUNTER SYMPTOMS
RESPIRATORY NEGATIVE: 1
BLURRED VISION: 1
BACK PAIN: 1
GASTROINTESTINAL NEGATIVE: 1

## 2018-11-12 ENCOUNTER — OFFICE VISIT (OUTPATIENT)
Dept: INTERNAL MEDICINE CLINIC | Age: 28
End: 2018-11-12
Payer: MEDICAID

## 2018-11-12 VITALS
HEART RATE: 53 BPM | OXYGEN SATURATION: 100 % | HEIGHT: 71 IN | BODY MASS INDEX: 22.96 KG/M2 | WEIGHT: 164 LBS | SYSTOLIC BLOOD PRESSURE: 122 MMHG | DIASTOLIC BLOOD PRESSURE: 70 MMHG

## 2018-11-12 DIAGNOSIS — Z72.0 TOBACCO ABUSE: Primary | ICD-10-CM

## 2018-11-12 DIAGNOSIS — R53.83 FATIGUE, UNSPECIFIED TYPE: ICD-10-CM

## 2018-11-12 DIAGNOSIS — Z23 NEED FOR PROPHYLACTIC VACCINATION AGAINST STREPTOCOCCUS PNEUMONIAE (PNEUMOCOCCUS): ICD-10-CM

## 2018-11-12 DIAGNOSIS — R55 SYNCOPE, UNSPECIFIED SYNCOPE TYPE: ICD-10-CM

## 2018-11-12 PROCEDURE — 99214 OFFICE O/P EST MOD 30 MIN: CPT | Performed by: NURSE PRACTITIONER

## 2018-11-12 PROCEDURE — G8484 FLU IMMUNIZE NO ADMIN: HCPCS | Performed by: NURSE PRACTITIONER

## 2018-11-12 PROCEDURE — 90732 PPSV23 VACC 2 YRS+ SUBQ/IM: CPT | Performed by: NURSE PRACTITIONER

## 2018-11-12 PROCEDURE — 36415 COLL VENOUS BLD VENIPUNCTURE: CPT | Performed by: NURSE PRACTITIONER

## 2018-11-12 PROCEDURE — G8420 CALC BMI NORM PARAMETERS: HCPCS | Performed by: NURSE PRACTITIONER

## 2018-11-12 PROCEDURE — G8427 DOCREV CUR MEDS BY ELIG CLIN: HCPCS | Performed by: NURSE PRACTITIONER

## 2018-11-12 PROCEDURE — 90471 IMMUNIZATION ADMIN: CPT | Performed by: NURSE PRACTITIONER

## 2018-11-12 PROCEDURE — 4004F PT TOBACCO SCREEN RCVD TLK: CPT | Performed by: NURSE PRACTITIONER

## 2018-11-12 RX ORDER — VARENICLINE TARTRATE 25 MG
KIT ORAL
Qty: 1 EACH | Refills: 0 | Status: SHIPPED | OUTPATIENT
Start: 2018-11-12 | End: 2019-06-30

## 2018-11-13 LAB
FOLATE: 10.77 NG/ML (ref 4.78–24.2)
VITAMIN B-12: 504 PG/ML (ref 211–911)
VITAMIN D 25-HYDROXY: 19 NG/ML

## 2018-11-19 ENCOUNTER — TELEPHONE (OUTPATIENT)
Dept: INTERNAL MEDICINE CLINIC | Age: 28
End: 2018-11-19

## 2018-11-20 ENCOUNTER — TELEPHONE (OUTPATIENT)
Dept: INTERNAL MEDICINE CLINIC | Age: 28
End: 2018-11-20

## 2018-11-20 NOTE — TELEPHONE ENCOUNTER
Yes, it would be fine to write up a letter stating that he can't work until he has his surgery and recovers.

## 2018-11-26 ENCOUNTER — TELEPHONE (OUTPATIENT)
Dept: INTERNAL MEDICINE CLINIC | Age: 28
End: 2018-11-26

## 2018-11-26 NOTE — TELEPHONE ENCOUNTER
Please generate a letter stating that the patient is not to work until he has had his surgery and recovered. I'll be happy to sign it. Just place it in the clear in basket on my desk.

## 2019-06-30 ENCOUNTER — HOSPITAL ENCOUNTER (EMERGENCY)
Age: 29
Discharge: HOME | End: 2019-06-30

## 2019-06-30 VITALS
BODY MASS INDEX: 23.03 KG/M2 | HEIGHT: 72 IN | SYSTOLIC BLOOD PRESSURE: 122 MMHG | OXYGEN SATURATION: 100 % | RESPIRATION RATE: 18 BRPM | DIASTOLIC BLOOD PRESSURE: 85 MMHG | HEART RATE: 74 BPM | WEIGHT: 170 LBS | TEMPERATURE: 98 F

## 2019-06-30 DIAGNOSIS — S39.012A STRAIN OF LUMBAR REGION, INITIAL ENCOUNTER: Primary | ICD-10-CM

## 2019-06-30 PROCEDURE — 6370000000 HC RX 637 (ALT 250 FOR IP): Performed by: PHYSICIAN ASSISTANT

## 2019-06-30 PROCEDURE — 96374 THER/PROPH/DIAG INJ IV PUSH: CPT

## 2019-06-30 PROCEDURE — 99283 EMERGENCY DEPT VISIT LOW MDM: CPT

## 2019-06-30 PROCEDURE — 6360000002 HC RX W HCPCS: Performed by: PHYSICIAN ASSISTANT

## 2019-06-30 RX ORDER — KETOROLAC TROMETHAMINE 30 MG/ML
30 INJECTION, SOLUTION INTRAMUSCULAR; INTRAVENOUS ONCE
Status: COMPLETED | OUTPATIENT
Start: 2019-06-30 | End: 2019-06-30

## 2019-06-30 RX ORDER — IBUPROFEN 800 MG/1
800 TABLET ORAL EVERY 6 HOURS PRN
Qty: 20 TABLET | Refills: 0 | Status: SHIPPED | OUTPATIENT
Start: 2019-06-30

## 2019-06-30 RX ORDER — DIAZEPAM 5 MG/1
5 TABLET ORAL ONCE
Status: COMPLETED | OUTPATIENT
Start: 2019-06-30 | End: 2019-06-30

## 2019-06-30 RX ORDER — LIDOCAINE 4 G/G
1 PATCH TOPICAL ONCE
Status: DISCONTINUED | OUTPATIENT
Start: 2019-06-30 | End: 2019-06-30 | Stop reason: HOSPADM

## 2019-06-30 RX ORDER — DIAZEPAM 5 MG/1
5 TABLET ORAL EVERY 6 HOURS PRN
Qty: 10 TABLET | Refills: 0 | Status: SHIPPED | OUTPATIENT
Start: 2019-06-30 | End: 2019-07-04

## 2019-06-30 RX ADMIN — DIAZEPAM 5 MG: 5 TABLET ORAL at 21:18

## 2019-06-30 RX ADMIN — KETOROLAC TROMETHAMINE 30 MG: 30 INJECTION, SOLUTION INTRAMUSCULAR; INTRAVENOUS at 21:18

## 2019-06-30 ASSESSMENT — PAIN SCALES - GENERAL
PAINLEVEL_OUTOF10: 7
PAINLEVEL_OUTOF10: 7

## 2019-06-30 ASSESSMENT — PAIN DESCRIPTION - PAIN TYPE: TYPE: ACUTE PAIN

## 2019-06-30 ASSESSMENT — PAIN DESCRIPTION - LOCATION: LOCATION: BACK

## 2019-07-01 NOTE — ED TRIAGE NOTES
Patient presents to ED with complaints of lower back pain. Denies any injury. Ambulates to triage without difficulty.

## 2019-07-01 NOTE — ED PROVIDER NOTES
Based on Pt's history (including stratification of the above red flags) & physical exam findings today, I do not see evidence of spinal cord compression/focal neuro deficits, cauda equina syndrome, epidural abscess, or osteomyelitis on exam today. History and exam is consistent with musculoskeletal back pain - Symptomatic treatment provided today. I discussed stretching exercises and avoid vigorous activity today at bedside. I recommend supportive OTC back brace for the next several days. I recommend followup with primary care provider over the next several days for recheck. Patient agrees to return emergency department if symptoms worsen or any new symptoms develop - this was discussed in detail at beside with Pt. Vital signs and nursing notes reviewed during ED course. I have independently evaluated this patient . Supervising MD present in the Emergency Department, available for consultation, throughout entirety of  patient care. Clinical  IMPRESSION    1. Strain of lumbar region, initial encounter                Comment: Please note this report has been produced using speech recognition software and may contain errors related to that system including errors in grammar, punctuation, and spelling, as well as words and phrases that may be inappropriate. If there are any questions or concerns please feel free to contact the dictating provider for clarification.        Cruzito Nixon PA-C  06/30/19 0480

## 2019-09-20 ENCOUNTER — APPOINTMENT (OUTPATIENT)
Dept: GENERAL RADIOLOGY | Age: 29
End: 2019-09-20
Payer: MEDICAID

## 2019-09-20 ENCOUNTER — HOSPITAL ENCOUNTER (EMERGENCY)
Age: 29
Discharge: HOME | End: 2019-09-20
Payer: MEDICAID

## 2019-09-20 VITALS
WEIGHT: 170 LBS | SYSTOLIC BLOOD PRESSURE: 122 MMHG | HEART RATE: 82 BPM | RESPIRATION RATE: 14 BRPM | OXYGEN SATURATION: 97 % | BODY MASS INDEX: 23.03 KG/M2 | TEMPERATURE: 98.6 F | DIASTOLIC BLOOD PRESSURE: 76 MMHG | HEIGHT: 72 IN

## 2019-09-20 DIAGNOSIS — J20.9 ACUTE BRONCHITIS, UNSPECIFIED ORGANISM: Primary | ICD-10-CM

## 2019-09-20 PROCEDURE — 6360000002 HC RX W HCPCS: Performed by: PHYSICIAN ASSISTANT

## 2019-09-20 PROCEDURE — 93005 ELECTROCARDIOGRAM TRACING: CPT | Performed by: PHYSICIAN ASSISTANT

## 2019-09-20 PROCEDURE — 99285 EMERGENCY DEPT VISIT HI MDM: CPT

## 2019-09-20 PROCEDURE — 71045 X-RAY EXAM CHEST 1 VIEW: CPT

## 2019-09-20 PROCEDURE — 96372 THER/PROPH/DIAG INJ SC/IM: CPT

## 2019-09-20 PROCEDURE — 94640 AIRWAY INHALATION TREATMENT: CPT

## 2019-09-20 PROCEDURE — 93010 ELECTROCARDIOGRAM REPORT: CPT | Performed by: INTERNAL MEDICINE

## 2019-09-20 PROCEDURE — 6370000000 HC RX 637 (ALT 250 FOR IP): Performed by: PHYSICIAN ASSISTANT

## 2019-09-20 PROCEDURE — 94761 N-INVAS EAR/PLS OXIMETRY MLT: CPT

## 2019-09-20 RX ORDER — GUAIFENESIN AND CODEINE PHOSPHATE 100; 10 MG/5ML; MG/5ML
5 SOLUTION ORAL 3 TIMES DAILY PRN
Qty: 120 ML | Refills: 0 | Status: SHIPPED | OUTPATIENT
Start: 2019-09-20 | End: 2019-09-23

## 2019-09-20 RX ORDER — GUAIFENESIN/DEXTROMETHORPHAN 100-10MG/5
5 SYRUP ORAL ONCE
Status: COMPLETED | OUTPATIENT
Start: 2019-09-20 | End: 2019-09-20

## 2019-09-20 RX ORDER — PREDNISONE 20 MG/1
60 TABLET ORAL ONCE
Status: COMPLETED | OUTPATIENT
Start: 2019-09-20 | End: 2019-09-20

## 2019-09-20 RX ORDER — DIPHENHYDRAMINE HCL 25 MG
25 TABLET ORAL EVERY 6 HOURS PRN
COMMUNITY

## 2019-09-20 RX ORDER — PREDNISONE 10 MG/1
60 TABLET ORAL DAILY
Qty: 30 TABLET | Refills: 0 | Status: SHIPPED | OUTPATIENT
Start: 2019-09-20 | End: 2019-09-25

## 2019-09-20 RX ORDER — KETOROLAC TROMETHAMINE 30 MG/ML
60 INJECTION, SOLUTION INTRAMUSCULAR; INTRAVENOUS ONCE
Status: COMPLETED | OUTPATIENT
Start: 2019-09-20 | End: 2019-09-20

## 2019-09-20 RX ORDER — IPRATROPIUM BROMIDE AND ALBUTEROL SULFATE 2.5; .5 MG/3ML; MG/3ML
1 SOLUTION RESPIRATORY (INHALATION) ONCE
Status: COMPLETED | OUTPATIENT
Start: 2019-09-20 | End: 2019-09-20

## 2019-09-20 RX ORDER — ALBUTEROL SULFATE 90 UG/1
2 AEROSOL, METERED RESPIRATORY (INHALATION) EVERY 6 HOURS PRN
Qty: 1 INHALER | Refills: 0 | Status: SHIPPED | OUTPATIENT
Start: 2019-09-20

## 2019-09-20 RX ADMIN — KETOROLAC TROMETHAMINE 60 MG: 30 INJECTION, SOLUTION INTRAMUSCULAR at 10:59

## 2019-09-20 RX ADMIN — IPRATROPIUM BROMIDE AND ALBUTEROL SULFATE 1 AMPULE: .5; 3 SOLUTION RESPIRATORY (INHALATION) at 11:09

## 2019-09-20 RX ADMIN — GUAIFENESIN AND DEXTROMETHORPHAN 5 ML: 100; 10 SYRUP ORAL at 10:59

## 2019-09-20 RX ADMIN — PREDNISONE 60 MG: 20 TABLET ORAL at 10:59

## 2019-09-20 ASSESSMENT — PAIN SCALES - GENERAL
PAINLEVEL_OUTOF10: 8
PAINLEVEL_OUTOF10: 3
PAINLEVEL_OUTOF10: 4

## 2019-09-20 ASSESSMENT — PAIN DESCRIPTION - ORIENTATION
ORIENTATION: RIGHT;LEFT;UPPER
ORIENTATION: RIGHT;LEFT;UPPER

## 2019-09-20 ASSESSMENT — PAIN DESCRIPTION - PAIN TYPE
TYPE: ACUTE PAIN
TYPE: ACUTE PAIN

## 2019-09-20 ASSESSMENT — PAIN DESCRIPTION - LOCATION
LOCATION: CHEST
LOCATION: CHEST

## 2019-09-24 LAB
EKG ATRIAL RATE: 70 BPM
EKG DIAGNOSIS: NORMAL
EKG P AXIS: 36 DEGREES
EKG P-R INTERVAL: 134 MS
EKG Q-T INTERVAL: 398 MS
EKG QRS DURATION: 96 MS
EKG QTC CALCULATION (BAZETT): 429 MS
EKG R AXIS: 75 DEGREES
EKG T AXIS: 56 DEGREES
EKG VENTRICULAR RATE: 70 BPM

## 2020-06-01 ENCOUNTER — CARE COORDINATION (OUTPATIENT)
Dept: CARE COORDINATION | Age: 30
End: 2020-06-01

## 2020-12-08 ENCOUNTER — HOSPITAL ENCOUNTER (EMERGENCY)
Age: 30
Discharge: LEFT BEFORE DISCHARGE | End: 2020-12-08
Payer: MEDICAID

## 2020-12-08 VITALS
DIASTOLIC BLOOD PRESSURE: 79 MMHG | RESPIRATION RATE: 19 BRPM | WEIGHT: 170 LBS | BODY MASS INDEX: 23.03 KG/M2 | OXYGEN SATURATION: 100 % | TEMPERATURE: 98.2 F | HEART RATE: 79 BPM | HEIGHT: 72 IN | SYSTOLIC BLOOD PRESSURE: 143 MMHG

## 2020-12-08 RX ORDER — TETRACAINE HYDROCHLORIDE 5 MG/ML
2 SOLUTION OPHTHALMIC SEE ADMIN INSTRUCTIONS
Status: DISCONTINUED | OUTPATIENT
Start: 2020-12-08 | End: 2020-12-08 | Stop reason: HOSPADM

## 2020-12-08 ASSESSMENT — PAIN SCALES - GENERAL: PAINLEVEL_OUTOF10: 10

## 2020-12-08 ASSESSMENT — PAIN DESCRIPTION - PAIN TYPE: TYPE: ACUTE PAIN

## 2020-12-08 ASSESSMENT — PAIN DESCRIPTION - ORIENTATION: ORIENTATION: RIGHT

## 2020-12-08 ASSESSMENT — PAIN DESCRIPTION - LOCATION: LOCATION: EYE

## 2020-12-08 NOTE — ED TRIAGE NOTES
Patient complains of inability to open right eye, pain and possible concrete in eye. Reports injury occurred at work but he doesn't want this to be a workers compensation claim.

## 2022-08-11 ENCOUNTER — HOSPITAL ENCOUNTER (EMERGENCY)
Age: 32
Discharge: HOME | End: 2022-08-11

## 2022-08-11 VITALS
DIASTOLIC BLOOD PRESSURE: 82 MMHG | TEMPERATURE: 98.5 F | HEART RATE: 68 BPM | SYSTOLIC BLOOD PRESSURE: 131 MMHG | HEIGHT: 72 IN | RESPIRATION RATE: 16 BRPM | OXYGEN SATURATION: 98 % | BODY MASS INDEX: 23.03 KG/M2 | WEIGHT: 170 LBS

## 2022-08-11 ASSESSMENT — PAIN DESCRIPTION - LOCATION: LOCATION: BACK;NECK

## 2022-08-11 ASSESSMENT — PAIN SCALES - GENERAL: PAINLEVEL_OUTOF10: 4

## 2022-08-12 ENCOUNTER — HOSPITAL ENCOUNTER (EMERGENCY)
Age: 32
Discharge: HOME | End: 2022-08-12
Payer: MEDICAID

## 2022-08-12 VITALS
DIASTOLIC BLOOD PRESSURE: 74 MMHG | TEMPERATURE: 97.9 F | RESPIRATION RATE: 14 BRPM | HEART RATE: 81 BPM | SYSTOLIC BLOOD PRESSURE: 127 MMHG | OXYGEN SATURATION: 100 %

## 2022-08-12 DIAGNOSIS — M25.511 RIGHT SHOULDER PAIN, UNSPECIFIED CHRONICITY: ICD-10-CM

## 2022-08-12 DIAGNOSIS — M54.50 LOW BACK PAIN, UNSPECIFIED BACK PAIN LATERALITY, UNSPECIFIED CHRONICITY, UNSPECIFIED WHETHER SCIATICA PRESENT: Primary | ICD-10-CM

## 2022-08-12 PROCEDURE — 99284 EMERGENCY DEPT VISIT MOD MDM: CPT

## 2022-08-12 PROCEDURE — 96372 THER/PROPH/DIAG INJ SC/IM: CPT

## 2022-08-12 PROCEDURE — 6360000002 HC RX W HCPCS: Performed by: PHYSICIAN ASSISTANT

## 2022-08-12 RX ORDER — MELOXICAM 7.5 MG/1
7.5 TABLET ORAL 2 TIMES DAILY
Qty: 30 TABLET | Refills: 0 | Status: SHIPPED | OUTPATIENT
Start: 2022-08-12

## 2022-08-12 RX ORDER — ACETAMINOPHEN 325 MG/1
650 TABLET ORAL EVERY 6 HOURS PRN
Qty: 40 TABLET | Refills: 0 | Status: SHIPPED | OUTPATIENT
Start: 2022-08-12

## 2022-08-12 RX ORDER — KETOROLAC TROMETHAMINE 30 MG/ML
30 INJECTION, SOLUTION INTRAMUSCULAR; INTRAVENOUS ONCE
Status: COMPLETED | OUTPATIENT
Start: 2022-08-12 | End: 2022-08-12

## 2022-08-12 RX ORDER — METHOCARBAMOL 500 MG/1
500 TABLET, FILM COATED ORAL 3 TIMES DAILY
Qty: 21 TABLET | Refills: 0 | Status: SHIPPED | OUTPATIENT
Start: 2022-08-12 | End: 2022-08-19

## 2022-08-12 RX ADMIN — KETOROLAC TROMETHAMINE 30 MG: 30 INJECTION, SOLUTION INTRAMUSCULAR; INTRAVENOUS at 15:28

## 2022-08-12 ASSESSMENT — PAIN DESCRIPTION - LOCATION
LOCATION: BACK
LOCATION: BACK

## 2022-08-12 ASSESSMENT — PAIN DESCRIPTION - FREQUENCY: FREQUENCY: INTERMITTENT

## 2022-08-12 ASSESSMENT — PAIN SCALES - GENERAL
PAINLEVEL_OUTOF10: 4
PAINLEVEL_OUTOF10: 10

## 2022-08-12 ASSESSMENT — PAIN DESCRIPTION - PAIN TYPE: TYPE: ACUTE PAIN

## 2022-08-12 ASSESSMENT — PAIN DESCRIPTION - ORIENTATION: ORIENTATION: LOWER

## 2022-08-12 ASSESSMENT — PAIN DESCRIPTION - DESCRIPTORS: DESCRIPTORS: SPASM

## 2022-08-12 NOTE — ED PROVIDER NOTES
[address] ENCOUNTER        Pt Name: Tameka Zarate  MRN: [id number]  [place] 1990  Date of evaluation: 8/12/2022  Provider: RENETTA Street  PCP: No primary care provider on file. ILA. I have evaluated this patient. My supervising physician was available for consultation. Triage CHIEF COMPLAINT       Chief Complaint   Patient presents with    Back Pain     Lower back, x1 week,  NKI     HISTORY OF PRESENT ILLNESS      Chief Complaint: Low back pain, right shoulder pain    Lizbeth Munoz II is a 33 y.o. male who presents to the emergency department today with a chief complaint of right shoulder pain, low back pain, has been lingering over the last several weeks. No new injury or trauma. Denies chest pain shortness of breath abdominal pain. No upper extremity into the right upper extremity. No paresthesias. No alarming symptoms regarding the back pain, no saddle anesthesia radicular weakness. No other risk significant rehab denies IV drug use. No history of recent admission no history of surgery. States he does a job with a lot of repetitive movement involving the shoulder and back. Nursing Notes were all reviewed and agreed with or any disagreements were addressed in the HPI. REVIEW OF SYSTEMS     Constitutional:   Denies fever, chills, weight loss or weakness   HENT:   Denies sore throat or ear pain   Cardiovascular:   Denies chest pain, palpitations   Respiratory:  Denies cough or shortness of breath    GI:   Denies abdominal pain, nausea, vomiting, or diarrhea  :  Denies any urinary symptoms.    Musculoskeletal: See HPI  Skin:   Denies rash  Neurologic:   Denies headache, focal weakness or sensory changes   Endocrine:  Denies polyuria or polydypsia   Lymphatic:  Denies swollen glands     PAST MEDICAL HISTORY     Past Medical History:   Diagnosis Date    Allergic rhinitis     Chronic bilateral low back pain with left-sided sciatica     H/O exercise stress test 09/25/2018    treadmill    History of Holter monitoring 09/20/2018    normal    Tobacco abuse 1/19/2018    Ulcer 2011       SURGICAL HISTORY     Past Surgical History:   Procedure Laterality Date    ABDOMEN SURGERY  2011    ulcer       CURRENTMEDICATIONS       Previous Medications    ALBUTEROL SULFATE HFA (PROVENTIL HFA) 108 (90 BASE) MCG/ACT INHALER    Inhale 2 puffs into the lungs every 6 hours as needed for Wheezing or Shortness of Breath    DIPHENHYDRAMINE (BENADRYL) 25 MG TABLET    Take 25 mg by mouth every 6 hours as needed for Itching    IBUPROFEN (ADVIL;MOTRIN) 800 MG TABLET    Take 1 tablet by mouth every 6 hours as needed for Pain       ALLERGIES     Patient has no known allergies. FAMILYHISTORY       Family History   Problem Relation Age of Onset    No Known Problems Mother     No Known Problems Brother     No Known Problems Other     No Known Problems Father     High Blood Pressure Maternal Grandfather         SOCIAL HISTORY       Social History     Socioeconomic History    Marital status: Single     Spouse name: None    Number of children: None    Years of education: None    Highest education level: None   Tobacco Use    Smoking status: Some Days     Packs/day: 0.50     Types: Cigars, Cigarettes    Smokeless tobacco: Never   Vaping Use    Vaping Use: Never used   Substance and Sexual Activity    Alcohol use: Yes     Comment: a couple shots last pm    Drug use: Yes     Types: Marijuana (Weed)    Sexual activity: Yes     Partners: Female       SCREENINGS           PHYSICAL EXAM       ED Triage Vitals [08/12/22 1406]   BP Temp Temp Source Heart Rate Resp SpO2 Height Weight   127/74 97.9 °F (36.6 °C) Oral 81 14 100 % -- --      Constitutional:  Well developed, Well nourished. No distress  HENT:  Normocephalic, Atraumatic, PERRL. EOMI. Sclera clear. Conjunctiva normal, No discharge.    Neck/Lymphatics: supple, no JVD, no swollen nodes  Cardiovascular:   RRR,  no murmurs/rubs/gallops. Respiratory:   Nonlabored breathing. Normal breath sounds, No wheezing  Abdomen: Bowel sounds normal, Soft, No tenderness, no masses. Musculoskeletal:    On evaluation of the right shoulder, has no obvious defect or deformity no discoloration, no palpable bony tenderness. Has full range of motion of the shoulder. No midline neck tenderness. No trapezius tenderness. Right upper extremity neurovascular intact, appropriate DTRs. - No gross deformity, swelling, or discoloration.    -  + generalized lumbar and Paralumbar tenderness without focus. No masses, fluctuance, warmth, or skin changes.  - No localized midline bony tenderness.   - No change in pain with forward flexion  - SLR test negative     No CVA tenderness to percussion  Neurologic:    - Alert & oriented person, place, time, and situation, no speech difficulties or slurring.  - No obvious gross motor deficits  - Cranial nerves 2-12 grossly intact  - Negative meningeal signs.  - Sensation intact to light touch  - Strength 5/5 in upper and lower extremities bilaterally    HIGH sensitivity Neuro Exam for Lumbar spine  -(L1-L2)  inner thigh sensation intact  -(S3,4,5) Groin sensation intact     -Lower extremity ROM intact including:       -(L2) cross legs (adduct thighs)        -(L3) extend knees & flex knees (S1)       -(L4) dorsiflex ankles       -(L5) point great toes upward & plantar flex toes (S2)        -(L2,3,4) Knee reflexes intact bilaterally  Vascular:    Femoral & Distal pulses, & capillary refill intact bilateral lower extremities   There is no edema, asymmetry, or calf / thigh tenderness bilaterally. No cyanosis. No cool or pale-appearing limb.   Distal cap refill and pulses intact bilateral upper and lower extremities  Bilateral upper and lower extremity ROM intact without pain or obvious deficit  Integument:   Warm, Dry  Neurologic:  Alert & oriented , No focal deficits noted. Cranial nerves II through XII grossly intact. Normal gross motor coordination & motor strength bilateral upper and lower extremities  Sensation intact. Psychiatric:  Affect normal, Mood normal.     DIAGNOSTIC RESULTS   LABS:    Labs Reviewed - No data to display    When ordered, only abnormal lab results are displayed. All other labs were within normal range or not returned as of this dictation. EKG: When ordered, EKG's are interpreted by the Emergency Department Physician in the absence of a cardiologist.  Please see their note for interpretation of EKG. RADIOLOGY:   Non-plain film images such as CT, Ultrasound and MRI are read by the radiologist. Plain radiographic images are visualized and preliminarily interpreted by the  ED Provider with the below findings:    Interpretation perthe Radiologist below, if available at the time of this note:    No orders to display     No results found. PROCEDURES   Unless otherwise noted below, none      CRITICAL CARE   CRITICAL CARE NOTE:   N/A    CONSULTS:  None      EMERGENCY DEPARTMENT COURSE and MDM:   Vitals:    Vitals:    08/12/22 1406   BP: 127/74   Pulse: 81   Resp: 14   Temp: 97.9 °F (36.6 °C)   TempSrc: Oral   SpO2: 100%       Patient was given thefollowing medications:  Medications   ketorolac (TORADOL) injection 30 mg (has no administration in time range)         Is this patient to be included in the SEP-1 Core Measure due to severe sepsis or septic shock? No   Exclusion criteria - the patient is NOT to be included for SEP-1 Core Measure due to: Infection is not suspected    MDM:  Patient presents as above. Emergent etiologies considered. Patient seen and examined. Work-up initiated secondary to presentation, physical exam findings, vital signs and medical chart review. In brief, 52-year-old male presenting the emergency department today with shoulder and back pain.   States that secondary to his job does a lot of repetitive

## 2022-08-12 NOTE — Clinical Note
Kathi Soriano was seen and treated in our emergency department on 8/12/2022. He may return to work on 08/15/2022. If you have any questions or concerns, please don't hesitate to call.       RENETTA Tucker

## 2022-08-12 NOTE — ACP (ADVANCE CARE PLANNING)
Patient does not have any ACP documents/Medical Power of . LSW notes hospital will follow [place]'s Next of Kin hierarchy in the following descending order for priority:     Guardian  Spouse  [de-identified] of adult Children  Parents  [de-identified] of adult Siblings  Nearest Relative not described above     Per [place]'s Next of Kin hierarchy: Patients' grandparent will be [address].

## 2022-08-12 NOTE — Clinical Note
Carlene Pineda was seen and treated in our emergency department on 8/12/2022. He may return to work on 08/15/2022. If you have any questions or concerns, please don't hesitate to call.       RENETTA Paulino

## 2022-08-12 NOTE — ED NOTES
Patient discharged to home at this time. Discharge instructions and follow up care discussed, patient voices understanding.       Rianna Veloz RN  08/12/22 [id number]

## 2022-09-01 ENCOUNTER — OFFICE VISIT (OUTPATIENT)
Dept: ORTHOPEDIC SURGERY | Age: 32
End: 2022-09-01
Payer: MEDICAID

## 2022-09-01 VITALS
BODY MASS INDEX: 23.03 KG/M2 | WEIGHT: 170 LBS | HEART RATE: 59 BPM | HEIGHT: 72 IN | SYSTOLIC BLOOD PRESSURE: 106 MMHG | DIASTOLIC BLOOD PRESSURE: 76 MMHG

## 2022-09-01 DIAGNOSIS — M75.41 IMPINGEMENT SYNDROME, SHOULDER, RIGHT: Primary | ICD-10-CM

## 2022-09-01 PROCEDURE — G8420 CALC BMI NORM PARAMETERS: HCPCS | Performed by: ORTHOPAEDIC SURGERY

## 2022-09-01 PROCEDURE — G8427 DOCREV CUR MEDS BY ELIG CLIN: HCPCS | Performed by: ORTHOPAEDIC SURGERY

## 2022-09-01 PROCEDURE — 4004F PT TOBACCO SCREEN RCVD TLK: CPT | Performed by: ORTHOPAEDIC SURGERY

## 2022-09-01 PROCEDURE — 99203 OFFICE O/P NEW LOW 30 MIN: CPT | Performed by: ORTHOPAEDIC SURGERY

## 2022-09-01 ASSESSMENT — ENCOUNTER SYMPTOMS
VOMITING: 0
WHEEZING: 0
CHEST TIGHTNESS: 0
EYE REDNESS: 0
COLOR CHANGE: 0
SHORTNESS OF BREATH: 0
EYE PAIN: 0
BACK PAIN: 1

## 2022-09-01 NOTE — PROGRESS NOTES
9/1/2022   Chief Complaint   Patient presents with    Shoulder Pain     Right         History of Present Illness:                             Ally Stanley II is a 33 y.o. male who presents today for evaluation of his right shoulder pain. He has had discomfort going on for about 3 months. Pain is worse with heavy repetitive overhead lifting activities. He also has pain while laying on that side at night. He denies any traumatic injuries or falls. No significant weakness. No instability. He has been able to maintain his activity level but has discomfort that is intermittent depending on his activity level that day. He denies any severe problems at this time. He has had no treatment in the past.    Pt. here today for evaluation R shoulder pain x months. Patient states lifting above head hurts and also laying on it. Currently in no pain. Pt. States no known injury. Medical History  Patient's medications, allergies, past medical, surgical, social and family histories were reviewed and updated as appropriate.     Past Medical History:   Diagnosis Date    Allergic rhinitis     Chronic bilateral low back pain with left-sided sciatica     H/O exercise stress test 09/25/2018    treadmill    History of Holter monitoring 09/20/2018    normal    Tobacco abuse 1/19/2018    Ulcer 2011     Past Surgical History:   Procedure Laterality Date    ABDOMEN SURGERY  2011    ulcer     Family History   Problem Relation Age of Onset    No Known Problems Mother     No Known Problems Brother     No Known Problems Other     No Known Problems Father     High Blood Pressure Maternal Grandfather      Social History     Socioeconomic History    Marital status: Single   Tobacco Use    Smoking status: Some Days     Packs/day: 0.50     Types: Cigars, Cigarettes    Smokeless tobacco: Never   Vaping Use    Vaping Use: Never used   Substance and Sexual Activity    Alcohol use: Yes     Comment: a couple shots last pm    Drug use: Yes     Types: Marijuana Rajinder Blow)    Sexual activity: Yes     Partners: Female     Current Outpatient Medications   Medication Sig Dispense Refill    meloxicam (MOBIC) 7.5 MG tablet Take 1 tablet by mouth in the morning and 1 tablet in the evening. 30 tablet 0    acetaminophen (AMINOFEN) 325 MG tablet Take 2 tablets by mouth every 6 hours as needed for Pain 40 tablet 0    diphenhydrAMINE (BENADRYL) 25 MG tablet Take 25 mg by mouth every 6 hours as needed for Itching (Patient not taking: Reported on 9/1/2022)      albuterol sulfate HFA (PROVENTIL HFA) 108 (90 Base) MCG/ACT inhaler Inhale 2 puffs into the lungs every 6 hours as needed for Wheezing or Shortness of Breath (Patient not taking: Reported on 9/1/2022) 1 Inhaler 0    ibuprofen (ADVIL;MOTRIN) 800 MG tablet Take 1 tablet by mouth every 6 hours as needed for Pain (Patient not taking: Reported on 9/1/2022) 20 tablet 0     No current facility-administered medications for this visit. No Known Allergies      Review of Systems   Constitutional:  Negative for chills and fever. HENT:  Negative for congestion and sneezing. Eyes:  Negative for pain and redness. Respiratory:  Negative for chest tightness, shortness of breath and wheezing. Cardiovascular:  Negative for chest pain and palpitations. Gastrointestinal:  Negative for vomiting. Musculoskeletal:  Positive for arthralgias and back pain. Skin:  Negative for color change and rash. Neurological:  Negative for weakness and numbness. Psychiatric/Behavioral:  Negative for agitation. The patient is not nervous/anxious. Examination:  General Exam:  Vitals: /76   Pulse 59   Ht 6' (1.829 m)   Wt 170 lb (77.1 kg)   BMI 23.06 kg/m²    Physical Exam  Vitals and nursing note reviewed. Constitutional:       Appearance: Normal appearance. HENT:      Head: Normocephalic and atraumatic.    Eyes:      Conjunctiva/sclera: Conjunctivae normal. Pupils: Pupils are equal, round, and reactive to light. Pulmonary:      Effort: Pulmonary effort is normal.   Musculoskeletal:      Left shoulder: No swelling, deformity, laceration, tenderness or bony tenderness. Normal range of motion. Normal strength. Normal pulse. Right elbow: No swelling, deformity, effusion or lacerations. Normal range of motion. No tenderness. Left elbow: Normal.      Cervical back: Normal range of motion. Comments: Right Upper Extremity:    There is mild global tenderness throughout the shoulder most significant at the anterior lateral aspect. There is no restricted range of motion of the shoulder with pain at the extremes of forward elevation and abduction. Forward elevation 160 degrees, abduction 150 degrees, internal rotation to t6, external rotation 30 degrees. Strength is 5/5 with rotator cuff testing with no identifiable weakness. Negative empty can sign. Negative drop arm sign. Positive Neer sign. Positive Rodriguez sign. Mild pain at the acromioclavicular joint with crossarm test.  No crepitation. No clicking at the shoulder with overhead rotational motion. Skin is intact. Sensation is intact throughout the upper extremity. No instability with passive motion of the shoulder. 2+ radial pulse. No edema. No muscle atrophy. Normal scapular motion. Skin:     General: Skin is warm and dry. Neurological:      Mental Status: He is alert and oriented to person, place, and time. Psychiatric:         Mood and Affect: Mood normal.         Behavior: Behavior normal.          Diagnostic testing:  X-ray images were reviewed by myself and discussed with the patient:  XR SHOULDER RIGHT (MIN 2 VIEWS)    Result Date: 9/1/2022  X-ray 4 views Right Shoulder: X-rays show no evidence of fracture or degenerative process. Normal bone density. Normal alignment. No abnormal calcifications or soft tissue swelling.  Impression: Normal x-ray        Office Procedures:  No orders of the defined types were placed in this encounter. Assessment and Plan  1. right shoulder impingement syndrome and rotator cuff tendinitis    I reassured patient that his shoulder appears functioning well on my exam and on x-rays. His exam is consistent with impingement syndrome. I recommended conservative treatments for this. I recommended over-the-counter anti-inflammatory medications. We discussed the use of ibuprofen. We discussed the possibility of formal physical therapy. He would like to defer in favor of a home exercise program.  I have given him home exercises to do and instructed him on resistance band strengthening of the rotator cuff along with stretching of the shoulder. I would like him to follow-up in 6 weeks for check of his response to this conservative treatment plan. If he is doing well he can cancel the appointment. We also discussed his chronic low back pain. I recommended yoga stretches and core strengthening activities. He will do these exercises from Internet search.       Electronically signed by Maine Banks MD on 9/1/2022 at 10:13 AM

## 2022-09-01 NOTE — PROGRESS NOTES
Pt. here today for evaluation R shoulder pain x months. Patient states lifting above head hurts and also laying on it. Currently in no pain. Pt. States no known injury.

## 2022-09-01 NOTE — LETTER
[address] [facility]  [address]  Phone: [phone]  Fax: [phone]    Radha Segura MD        September 1, 2022     Patient: Petros Ann II   YOB: 1990   Date of Visit: 9/1/2022       To Whom It May Concern: It is my medical opinion that Gail Ramirez was seen in our office today 9/1/22. If you have any questions or concerns, please don't hesitate to call.     Sincerely,        Radha Segura MD

## 2023-01-27 ENCOUNTER — HOSPITAL ENCOUNTER (OUTPATIENT)
Dept: PSYCHIATRY | Age: 33
Setting detail: THERAPIES SERIES
Discharge: HOME | End: 2023-01-27
Payer: MEDICAID

## 2023-01-27 PROCEDURE — 90791 PSYCH DIAGNOSTIC EVALUATION: CPT

## 2023-01-27 PROCEDURE — 80305 DRUG TEST PRSMV DIR OPT OBS: CPT

## 2023-02-10 ENCOUNTER — HOSPITAL ENCOUNTER (OUTPATIENT)
Dept: PSYCHIATRY | Age: 33
Setting detail: THERAPIES SERIES
Discharge: HOME | End: 2023-02-10
Payer: MEDICAID

## 2023-02-10 PROCEDURE — 90834 PSYTX W PT 45 MINUTES: CPT

## 2023-02-10 PROCEDURE — 80305 DRUG TEST PRSMV DIR OPT OBS: CPT

## 2023-02-10 ASSESSMENT — ANXIETY QUESTIONNAIRES
4. TROUBLE RELAXING: 2
6. BECOMING EASILY ANNOYED OR IRRITABLE: 0
7. FEELING AFRAID AS IF SOMETHING AWFUL MIGHT HAPPEN: 2
3. WORRYING TOO MUCH ABOUT DIFFERENT THINGS: 2
2. NOT BEING ABLE TO STOP OR CONTROL WORRYING: 2
GAD7 TOTAL SCORE: 8
5. BEING SO RESTLESS THAT IT IS HARD TO SIT STILL: 0
1. FEELING NERVOUS, ANXIOUS, OR ON EDGE: 0
IF YOU CHECKED OFF ANY PROBLEMS ON THIS QUESTIONNAIRE, HOW DIFFICULT HAVE THESE PROBLEMS MADE IT FOR YOU TO DO YOUR WORK, TAKE CARE OF THINGS AT HOME, OR GET ALONG WITH OTHER PEOPLE: SOMEWHAT DIFFICULT

## 2023-02-10 NOTE — PROGRESS NOTES
[facility] TREAT[facility]      Location: [x] [place] [] [place]    Treatment plan: Initial      Strengths: Hard Working, Provider, Helpful    Weakness/Limitations: Being more punctual    Service/Frequency/Duration: Individual 1x Week in 90 Days, Group assigned 1x Week in 90 Days, Urinalysis Random in 90 Days, AA/NA 6 in 90 Days, Sponsor As Needed in 90 Days and Case Management As Needed in 90 Days    Diagnosis:  F10.20 Other and unspecified alcohol dependence/unspecified drinking behavior and F12.20 Cannabis dependence-unspecified use    Level of Care: 1 Outpatient Services    Problem: Obtained a possession charge, has history of substance use, and a pending court date  Goal: Abstain from using alcohol in 90 Days   Objectives:   1) Identify 3 Indicators of Addiction in 90 Days Evaluation Date: 2/28/23 Code: C Continue TBD   2) Identify 3 Negative Effects of Substance Use in 90 Days Evaluation Date: 2/28/23 Code: C Continue TBD  3) Identify 3 Ways Staying Clean could positively impact your life in 80 Days Evaluation Date: 2/28/23 Code: C Continue TBD    2. Problem: Lacks Coping Skills to Maintain Long-term Sobriety   Goal: Acquire necessary skills to maintain sobriety in 90 Days   Objectives:   1) Identify 3 Changes that you will make that support your recovery in 90 Days Evaluation Date: 2/28/23 Code: C Continue TBD   2) Identify 3 External/Internal Triggers and Sober Responses to them in 90 Days Evaluation Date: 2/28/23 Code: C Continue TBD   3) 1x Month contact with [facility]  for support in 90 Days Evaluation Date:  2/28/23 Code: C Continue TBD     3.     Problem: Lacks Understanding and Knowledge of Addiction   Goal: Develop increased awareness of the Disease of Addiction and Relapse triggers and coping strategies needed to effectively deal with them that support long-term sobriety in 90 Days  Objectives:   1) Identify 3 Ways to achieve a quality of life that is free from using all MAS on a continuing bases in 90 Days Evaluation Date: 2/28/23 Code: C Continue TBD  2) Identify 3 Strategies to manage urges to lapse back into substance use in 90 Days Evaluation Date: 2/28/23 Code: C Continue TBD  2) Identify 5 Stages of Drug & Alcohol Addiction in 90 Days Evaluation Date: 2/28/23 Code: C Continue TBD    Defer: Needs Medical Care    Discharge Plan/Instructions: Complete treatment plan goals and objectives, probation recommendations and maintain sobriety. Lisa Palma II / 1990 has participated in the treatment plan development outlined above on 2/10/2023.      BRYCE Sanchez  9/88/9548/7:30 PM

## 2023-02-10 NOTE — PROGRESS NOTES
[facility]      Location: [x] [place] [] Nicole zee    Treatment plan: Initial      Strengths: Hard Working, Good Father, Good Person    Weakness/Limitations: Managing things (time, money), Procrastinate    Service/Frequency/Duration: Individual 1x Week in 90 Days, Group assigned 1x Week in 90 Days, Urinalysis Random in 90 Days, AA/NA 6 in 90 Days, Sponsor As Needed in 90 Days and Case Management As Needed in 90 Days    Diagnosis:  F10.20 Other and unspecified alcohol dependence/unspecified drinking behavior and F12.20 Cannabis dependence-unspecified use    Level of Care: 1 Outpatient Services    Problem: Obtained a possession charge, has history of substance use, and a pending court date  Goal: Abstain from using alcohol in 90 Days   Objectives:   1) Identify 3 Indicators of Addiction in 90 Days Evaluation Date: 5/10/23 Code: C Continue TBD   2) Identify 3 Negative Effects of Substance Use in 90 Days Evaluation Date: 2/28/23 Code: C Continue TBD  3) Identify 3 Ways Staying Clean could positively impact your life in 90 Days Evaluation Date: 2/28/23 Code: C Continue TBD    2. Problem: Lacks Coping Skills to Maintain Long-term Sobriety   Goal: Acquire necessary skills to maintain sobriety in 90 Days   Objectives:   1) Identify 3 Changes that you will make that support your recovery in 90 Days Evaluation Date: 2/28/23 Code: C Continue TBD   2) Identify 3 External/Internal Triggers and Sober Responses to them in 90 Days Evaluation Date: 2/28/23 Code: C Continue TBD   3) 1x Month contact with [facility]  for support in 90 Days Evaluation Date:  2/28/23 Code: C Continue TBD     3.     Problem: Lacks Understanding and Knowledge of Addiction   Goal: Develop increased awareness of the Disease of Addiction and Relapse triggers and coping strategies needed to effectively deal with them that support long-term sobriety in 90 Days  Objectives:   1) Identify 3 Ways to achieve a quality of life that is free from using all MAS on a continuing bases in 90 Days Evaluation Date: 2/28/23 Code: C Continue TBD  2) Identify 3 Strategies to manage urges to lapse back into substance use in 90 Days Evaluation Date: 2/28/23 Code: C Continue TBD  2) Identify 5 Stages of Drug & Alcohol Addiction in 90 Days Evaluation Date: 2/28/23 Code: C Continue TBD    Defer: Needs Medical Care    Discharge Plan/Instructions: Complete treatment plan goals and objectives, probation recommendations and maintain sobriety. Misa Jefferson II / 1990 has participated in the treatment plan development outlined above on 2/10/2023.      BRYCE Sanchez  0/18/9587/4:46 PM

## 2023-02-10 NOTE — PROGRESS NOTES
[facility]                Progress Note    [x] [place]  [] Nicole zee                    Patient Name: Quang Ku II   : 1990     Case # :  [id number]  Therapist: Nilay León [facility]        Objective/Service/Time:  ASSESSMENT PART II    1-HOUR    S:  Client attended second session. He reports his last drink two weeks ago. He has court on 23 at 9:45 am.      O:  Client was cooperative. A:  Reviewed assessment, received treatment recommendations, and completed Initial Treatment Plan. P:  Recommendations Level I Outpatient treatment.                    Edgar Willis MA, [facility] Hospital[facility], 73, 7:50 PM

## 2023-02-14 ENCOUNTER — HOSPITAL ENCOUNTER (OUTPATIENT)
Dept: PSYCHIATRY | Age: 33
Setting detail: THERAPIES SERIES
Discharge: HOME | End: 2023-02-14
Payer: MEDICAID

## 2023-02-14 PROCEDURE — 90853 GROUP PSYCHOTHERAPY: CPT
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
## 2023-02-17 ENCOUNTER — HOSPITAL ENCOUNTER (OUTPATIENT)
Dept: PSYCHIATRY | Age: 33
Setting detail: THERAPIES SERIES
Discharge: HOME | End: 2023-02-17
Payer: MEDICAID

## 2023-02-17 PROCEDURE — 80305 DRUG TEST PRSMV DIR OPT OBS: CPT

## 2023-02-17 PROCEDURE — 90834 PSYTX W PT 45 MINUTES: CPT

## 2023-02-17 NOTE — PROGRESS NOTES
612 [facility]        Individual  Progress Note    Location: [x] [place] [] Nicole zee                   Patient Name: Yenifer Valdez   : 1990     Case # :  [id number]  Therapist: TOMMY Soto        Objective/Service/Time:     CASE MANAGEMENT  Problem 2 Objective 3    S:  I met with client about case management request.  Client stated that he is currently living with his grandfather, but would like to find his own place. I provided him with a list of affordable housing apartments and landlords. He also expressed an interest in food resources. I gave him a list f food pantries. We discussed SNAP, but client is over income due to employment through [employer] that he began in 2022. Client currently has [facility]. He explained that he does not have a PCP and has concerns about depression and is also interested in mental health evaluation. He signed RONNELL for [facility] and I sent referral.      O:  Client was oriented and open to discussion. A:  Client was provided resources listed above and will make contact. P:  I will follow up with C and client about appointments. Client will continue to speak with Therapist or  about requests for assistance if needed.       SHERRY Soto, [address], CTTS       Electronically signed by Charla Saba on 2023 at 3:38 PM

## 2023-02-17 NOTE — PROGRESS NOTES
612                 Progress Note    [x] [place]  [] Nicole zee                    Patient Name: Marian Narayanan II   : 1990     Case # :  [id number]  Therapist: Sara León [facility]        Objective/Service/Time:    1-HOUR    S:  Client completed individual session. He stated, \"I smoke 1-2 blunts daily which helps me with pain, energy and racing thoughts. \" \"Sometimes I get depressed and I just don't want to be bothered. \" I reinforced the importance of [facility] being an abstinence program, had Heriberto Garcia sign and RONNELL for the [address] to obtain a PCP and a referral to a mental health provider. O:  Client was cooperative, appeared happy, and completed a February UDS. A:  Client is in he contemplation stage of change. We discussed what he can do to support his on-going recovery (See a doctor about not sleeping through the night and his racing thoughts. P:  Therapist will continue to build trust and confidence with client to allow space for exploration of previous events that brought him to treatment.                   Arely Rosario MA, [facility], , 6:26 PM

## 2023-02-21 ENCOUNTER — HOSPITAL ENCOUNTER (OUTPATIENT)
Dept: PSYCHIATRY | Age: 33
Setting detail: THERAPIES SERIES
Discharge: HOME | End: 2023-02-21
Payer: MEDICAID

## 2023-02-21 PROCEDURE — 90853 GROUP PSYCHOTHERAPY: CPT

## 2023-02-22 NOTE — GROUP NOTE
612  Group Therapy Note      2/22/2023    Location:  [facility]      Clients Presents: 9163, 2346, 7897, 1357, 1380     Clients Absent:       Length of session: 1.5 hours    Group Note: OP    Group Type: Co-Ed    New members were welcomed and introduced. Norms and expectations of group were discussed. Content: Understanding Criteria of Substance Abuse         BRYCE Lauren-RUCHI  0/67/8931 1:00 PM        Co-Therapist: N/A      [facility] Individual Group Progress Note    Baljinder Pastor II  1990 2/22/2023    Notes on Client Progress in [address] [place] [address] attended group, introduced himself and events leading to arrival: presented check information: discussed history of crack progression and financial and legal consequences.            BONILLA Lauren  1/54/0655 5:51 PM        Co-Therapist: N/A

## 2023-02-24 ENCOUNTER — HOSPITAL ENCOUNTER (OUTPATIENT)
Dept: PSYCHIATRY | Age: 33
Setting detail: THERAPIES SERIES
Discharge: HOME | End: 2023-02-24
Payer: MEDICAID

## 2023-02-24 PROCEDURE — 90834 PSYTX W PT 45 MINUTES: CPT

## 2023-02-24 NOTE — PROGRESS NOTES
[facility]                Progress Note    [x] [place]  [] Tomasa                    Patient Name: Presley Earl II   : 1990     Case # :  [id number]  Therapist: BRYCE Meade        Objective/Service/Time:      1-HOUR    GOAL 1a  OBJECTIVE 1     S:  Client completed individual session. He stated, \"I've been cutting down on my smoking.\" Client was given information sheet about returning a [facility] to make an appointment to be seen because they had been calling him. He stated:  \"I'm waiting for a letter to come to show when I need to be at court.     O:  Client was cooperative, appeared happy, and reviewed February UDS results (+ THC @ 167).     A:  Client is in he contemplation stage of change. Client reviewed hand-out and identified three indicators of addiction: Loss of Control, Need and Compulsion, Continued Use Despite Adverse Consequences. We discussed what he can do to support his on-going recovery (See a doctor about not sleeping through the night and his racing thoughts. Client     P:  Therapist will continue to build trust and confidence with client to allow space for exploration of previous events that brought him to treatment.                Gail León MA, BRYCE, 23, 2:12 PM

## 2023-02-24 NOTE — PROGRESS NOTES
[facility] TREATMENT PLAN      Location: [x] [place] [] Nicole zee    Treatment plan: Initial      Strengths: Hard Working, Provider, Helpful    Weakness/Limitations: Being more punctual    Service/Frequency/Duration: Individual 1x Week in 90 Days, Group assigned 1x Week in 90 Days, Urinalysis Random in 80 Days, AA/NA 6 in 90 Days, Sponsor As Needed in 90 Days and Case Management As Needed in 90 Days    Diagnosis:  F10.20 Other and unspecified alcohol dependence/unspecified drinking behavior and F12.20 Cannabis dependence-unspecified use    Level of Care: 1 Outpatient Services    Problem: Obtained a possession charge, has history of substance use, and a pending court date  Goal: Abstain from using alcohol in 90 Days   Objectives:   1) Identify 3 Indicators of Addiction in 90 Days Evaluation Date: 2/28/23 Code: Achieved 2/24/23 Loss of Control, Need and Compulsion, Continued Use Despite Adverse Consequences    2) Identify 3 Negative Effects of Substance Use in 90 Days Evaluation Date: 2/28/23 Code: C Continue TBD  3) Identify 3 Ways Staying Clean could positively impact your life in 80 Days Evaluation Date: 2/28/23 Code: C Continue TBD    2. Problem: Lacks Coping Skills to Maintain Long-term Sobriety   Goal: Acquire necessary skills to maintain sobriety in 90 Days   Objectives:   1) Identify 3 Changes that you will make that support your recovery in 90 Days Evaluation Date: 2/28/23 Code: C Continue TBD   2) Identify 3 External/Internal Triggers and Sober Responses to them in 90 Days Evaluation Date: 2/28/23 Code: C Continue TBD   3) 1x Month contact with [facility]  for support in 90 Days Evaluation Date:  2/28/23 Code: Achieved 2/17/23 Provided list of affordable housing apartments/landlords, List of pantries, and signed an RONNELL for the [address] to obtain a PCP to address his depression    3.     Problem: Lacks Understanding and Knowledge of Addiction   Goal: Develop increased awareness of the Disease of Addiction and Relapse triggers and coping strategies needed to effectively deal with them that support long-term sobriety in 90 Days  Objectives:   1) Identify 3 Ways to achieve a quality of life that is free from using all MAS on a continuing bases in 90 Days Evaluation Date: 2/28/23 Code: C Continue TBD  2) Identify 3 Strategies to manage urges to lapse back into substance use in 90 Days Evaluation Date: 2/28/23 Code: C Continue TBD  2) Identify 5 Stages of Drug & Alcohol Addiction in 90 Days Evaluation Date: 2/28/23 Code: C Continue TBD    Defer: Needs Medical Care    Discharge Plan/Instructions: Complete treatment plan goals and objectives, probation recommendations and maintain sobriety. Nicki Coffey II / 1990 has participated in the treatment plan development outlined above on 2/24/2023.      BRYCE Sanchez  1/05/8209/9:26 PM

## 2023-02-28 ENCOUNTER — HOSPITAL ENCOUNTER (OUTPATIENT)
Dept: PSYCHIATRY | Age: 33
Setting detail: THERAPIES SERIES
Discharge: HOME | End: 2023-02-28
Payer: MEDICAID

## 2023-02-28 PROCEDURE — 90853 GROUP PSYCHOTHERAPY: CPT
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
## 2023-03-07 ENCOUNTER — HOSPITAL ENCOUNTER (OUTPATIENT)
Dept: PSYCHIATRY | Age: 33
Setting detail: THERAPIES SERIES
Discharge: HOME | End: 2023-03-07
Payer: MEDICAID

## 2023-03-07 PROCEDURE — 90853 GROUP PSYCHOTHERAPY: CPT

## 2023-03-08 NOTE — GROUP NOTE
612 [facility] Group Therapy Note      3/8/2023    Location:  [employer]      Clients Presents: 7797, 1713, 1363, 1382, 1380    Clients Absent: 1357    Length of session: 1.5 hours    Group Note: OP    Group Type: Co-Ed    New members were welcomed and introduced. Norms and expectations of group were discussed. Content: Understanding Personality Disorders: Substance Use and Relapse       BONILLA George  7/9/1738 1:39 AM    Co-Therapist: N/A      [facility] Individual Group Progress Note    David Ziegler II  1990  3/8/2023    Notes on Client Progress in Group      Arlette Jean Baptistes attended group, introduced himself and events leading to arrival: presented check in information: expressed acclimating and adjusting: identified obsessive and compulsive traits.            BONILLA George  1/9/0770 3:65 AM        Co-Therapist: N/A

## 2023-03-10 ENCOUNTER — HOSPITAL ENCOUNTER (OUTPATIENT)
Dept: PSYCHIATRY | Age: 33
Setting detail: THERAPIES SERIES
Discharge: HOME | End: 2023-03-10
Payer: MEDICAID

## 2023-03-10 PROCEDURE — 90834 PSYTX W PT 45 MINUTES: CPT

## 2023-03-10 NOTE — PROGRESS NOTES
612 [facility] TREATMENT PLAN      Location: [x] [place] [] Nicole zee    Treatment plan: Initial  8791    Strengths: Hard Working, Provider, Helpful    Weakness/Limitations: Being more punctual    Service/Frequency/Duration: Individual 1x Week in 90 Days, Group assigned 1x Week in 90 Days, Urinalysis Random in 90 Days, AA/NA 6 in 90 Days, Sponsor As Needed in 90 Days and Case Management As Needed in 90 Days    Diagnosis:  F10.20 Other and unspecified alcohol dependence/unspecified drinking behavior and F12.20 Cannabis dependence-unspecified use    Level of Care: 1 Outpatient Services    Problem: Obtained a possession charge, has history of substance use, and a pending court date  Goal: Abstain from using alcohol in 90 Days   Objectives:   1) Identify 3 Indicators of Addiction in 90 Days Evaluation Date: 2/28/23 Code: Achieved 2/24/23 Loss of Control, Need and Compulsion, Continued Use Despite Adverse Consequences    2) Identify 3 Negative Effects of Substance Use in 90 Days Evaluation Date: 2/28/23 Code: Achieved  3/10/23  Lazy, Cloudy Mind, Poor Judgement  3) Identify 3 Ways Staying Clean could positively impact your life in 80 Days Evaluation Date: 2/28/23 Code: C Continue TBD    2. Problem: Lacks Coping Skills to Maintain Long-term Sobriety   Goal: Acquire necessary skills to maintain sobriety in 90 Days   Objectives:   1) Identify 3 Changes that you will make that support your recovery in 90 Days Evaluation Date: 2/28/23 Code: C Continue TBD   2) Identify 3 External/Internal Triggers and Sober Responses to them in 90 Days Evaluation Date: 2/28/23 Code: C Continue TBD   3) 1x Month contact with [facility]  for support in 90 Days Evaluation Date:  2/28/23 Code: Achieved 2/17/23 Provided list of affordable housing apartments/landlords, List of pantries, and signed an RONNELL for the [address] to obtain a PCP to address his depression    3.     Problem: Lacks Understanding and Knowledge of Addiction   Goal: Develop increased awareness of the Disease of Addiction and Relapse triggers and coping strategies needed to effectively deal with them that support long-term sobriety in 90 Days  Objectives:   1) Identify 3 Ways to achieve a quality of life that is free from using all MAS on a continuing bases in 90 Days Evaluation Date: 2/28/23 Code: C Continue TBD  2) Identify 3 Strategies to manage urges to lapse back into substance use in 90 Days Evaluation Date: 2/28/23 Code: C Continue TBD  2) Identify 5 Stages of Drug & Alcohol Addiction in 90 Days Evaluation Date: 2/28/23 Code: Achieved  3/10/23 Experimentation, Regular use, Risky use, Dependence, Addiction     Defer: Needs Medical Care    Discharge Plan/Instructions: Complete treatment plan goals and objectives, probation recommendations and maintain sobriety. Nena Sanchez II / 1990 has participated in the treatment plan development outlined above on 3/10/2023.      Cirilo Matias  6/35/2245/4:74 PM

## 2023-03-10 NOTE — PROGRESS NOTES
612 [facility]                Progress Note    [x] [place]  [] Janell Dominguez                    Patient Name: Cheri Valladares II   : 1990     Case # :  [id number]  Therapist: BRYCE Walker        Objective/Service/Time:    1-HOUR     GOAL 1a  OBJECTIVE 2    GOAL 3a  OBJECTIVE 3      S:  Client completed individual session. He stated, \"I have an appointment with the [address] [place] [address] [address] 2023\". Client reported going to several job placement agencies and has his resume on \"[employer] to obtain employment. He was also given documentation from [facility]  for other employment opportunities. O:  Client was cooperative, oriented 4x and dressed appropriately. A:  Client is in he contemplation stage of change. Client identified the following effects of using substances: Lazy, Cloudy Mind, Poor Judgement. We discussed hand-out where client identified the 5 stages of drug and alcohol addiction: Experimentation, Regular use, Risky use, Dependence, Addiction. P:  Therapist will continue to build trust and confidence with client to allow space for exploration of previous events that brought him to treatment.                   Rose Whitaker MA, [facility], 62, 7:26 PM

## 2023-03-14 ENCOUNTER — HOSPITAL ENCOUNTER (OUTPATIENT)
Dept: PSYCHIATRY | Age: 33
Setting detail: THERAPIES SERIES
Discharge: HOME | End: 2023-03-14
Payer: MEDICAID

## 2023-03-14 PROCEDURE — 90853 GROUP PSYCHOTHERAPY: CPT

## 2023-03-14 NOTE — GROUP NOTE
612 [facility] Group Therapy Note      3/14/2023    Location:  [employer]        Clients Presents: 1575, 1007, 4399, 1380      Clients Absent: 9042    Length of session: 1.5 hours    Group Note: OP    Group Type: Co-Ed    New members were welcomed and introduced. Norms and expectations of group were discussed. Content: Understanding Internal and External Triggers      BONILLA Cutler  2/77/9263 0:51 PM    Co-Therapist: N/A      [facility] Individual Group Progress Note    Deirdre Velazquezing II  1990  3/14/2023    Notes on Client Progress in [address] attended group, introduced himself and events leading to arrival: presented check in information: insightful that she spend significant amount of money on secondary consequences: loss a lot of money gambling, court, still struggle with money management: desire to learn how to budget and management income within net pay instead gross pay.         BONILLA Cutler  4/75/6058 6:83 PM      Co-Therapist: N/A

## 2023-03-17 ENCOUNTER — HOSPITAL ENCOUNTER (OUTPATIENT)
Dept: PSYCHIATRY | Age: 33
Setting detail: THERAPIES SERIES
Discharge: HOME | End: 2023-03-17
Payer: MEDICAID

## 2023-03-17 PROCEDURE — 80305 DRUG TEST PRSMV DIR OPT OBS: CPT

## 2023-03-17 PROCEDURE — 90834 PSYTX W PT 45 MINUTES: CPT

## 2023-03-17 NOTE — PROGRESS NOTES
612 [facility] TREATMENT PLAN      Location: [x] [place] [] Sarah Andre    Treatment plan:  [facility]  8035  (3/17/23)    Strengths: Hard Working, Provider, Helpful    Weakness/Limitations: Being more punctual    Service/Frequency/Duration: Individual 1x Week in 90 Days, Group assigned 1x Week in 90 Days, Urinalysis Random in 90 Days, AA/NA 6 in 90 Days, Sponsor As Needed in 90 Days and Case Management As Needed in 90 Days    Diagnosis:  F10.20 Other and unspecified alcohol dependence/unspecified drinking behavior and F12.20 Cannabis dependence-unspecified use    Level of Care: 1 Outpatient Services    Problem: Obtained a possession charge, has history of substance use, and a pending court date  Goal: Abstain from using alcohol in 90 Days   Objectives:   1) Identify 3 Indicators of Addiction in 90 Days Evaluation Date: 4/28/23 Code: Achieved 3/24/23 Loss of Control, Need and Compulsion, Continued Use Despite Adverse Consequences    2) Identify 3 Negative Effects of Substance Use in 90 Days Evaluation Date: 4/28/23 Code: Achieved  3/10/23  Lazy, Cloudy Mind, Poor Judgement  3) Identify 3 Ways Staying Clean could positively impact your life in 80 Days Evaluation Date: 4/28/23 Code: C Continue TBD    2. Problem: Lacks Coping Skills to Maintain Long-term Sobriety   Goal: Acquire necessary skills to maintain sobriety in 90 Days   Objectives:   1) Identify 3 Emotions that may trigger Anger in 90 Days Evaluation Date: 4/28/23 Code: Achieved 3/17/23 threatened, stress, hurt  2) Identify 3 External/Internal Triggers and Sober Responses to them in 90 Days Evaluation Date: 4/28/23 Code: C Continue TBD   3) 1x Month contact with [facility]  for support in 90 Days Evaluation Date:  4/28/23 Code: Achieved 2/17/23 Provided list of affordable housing apartments/landlords, List of pantries, and signed an RONNELL for the [address] to obtain a PCP to address his depression    3.     Problem: Lacks Understanding and Knowledge of Addiction Goal: Develop increased awareness of the Disease of Addiction and Relapse triggers and coping strategies needed to effectively deal with them that support long-term sobriety in 90 Days  Objectives:   1) Identify 3 Ways to achieve a quality of life that is free from using all MAS on a continuing bases in 90 Days Evaluation Date: 4/28/23 Code: C Continue TBD  2) Identify 3 Strategies to manage urges to lapse back into substance use in 90 Days Evaluation Date: 4/28/23 Code: C Continue TBD  2) Identify 5 Stages of Drug & Alcohol Addiction in 90 Days Evaluation Date: 4/28/23 Code: Achieved  3/10/23 Experimentation, Regular use, Risky use, Dependence, Addiction     Defer: Needs Medical Care    Discharge Plan/Instructions: Complete treatment plan goals and objectives, probation recommendations and maintain sobriety. Sharon Saldaña II / 1990 has participated in the treatment plan development outlined above on 3/17/2023.      Bear Fischer  0/05/0003/1:55 PM

## 2023-03-17 NOTE — PROGRESS NOTES
612 [facility]                Progress Note    [x] Kasey  [] Nicole zee                    Patient Name: Mckenzie Carpenter II   : 1990     Case # :  [id number]  Therapist: BRYCE Arredondo        Objective/Service/Time:    1-HOUR    GOAL 2a  OBJECTIVE 1      S:  Client completed individual session. He stated, \"I haven't smoked any weed in a couple of days. \" Client continues looking for employment. 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 \" He reports no major concerns and will picking his daughter up from school to spend the weekend with him. O:  Client was cooperative, oriented 4x and dressed appropriately. A:  Client is in he contemplation stage of change. We discussed hand-out where client identified underlined emotions that hide underneath the surface of anger (i.e. threatened, stress, hurt).      P:  Therapist will continue to build trust and confidence with client to allow space for exploration of previous events that brought him to treatment                Francis Mclaughlin MA, [facility], , 9:58 PM

## 2023-03-21 ENCOUNTER — HOSPITAL ENCOUNTER (OUTPATIENT)
Dept: PSYCHIATRY | Age: 33
Setting detail: THERAPIES SERIES
Discharge: HOME | End: 2023-03-21
Payer: MEDICAID

## 2023-03-21 NOTE — GROUP NOTE
612 [facility] Group Therapy Note      3/21/2023    Location:  [employer]      Clients Presents: 6035, 3604, 1328, 1388, 1363, 1380     Clients Absent: 1382    Length of session: 1.5 hours    Group Note: OP    Group Type: Co-Ed    New members were welcomed and introduced. Norms and expectations of group were discussed.       Content:  Understanding Cognitive Thinking Errors, Anger Management and Relapse        BONILLA Acosta  5/06/4156 8:96 PM      Co-Therapist: N/A      [facility] Individual Group Progress Note    Coco Basurto II  1990  3/21/2023    Notes on Client Progress in Group      Cancelled session         BONILLA Acosta  0/32/3913 9:34 PM      Co-Therapist: N/A

## 2023-03-24 ENCOUNTER — HOSPITAL ENCOUNTER (OUTPATIENT)
Dept: PSYCHIATRY | Age: 33
Setting detail: THERAPIES SERIES
Discharge: HOME | End: 2023-03-24
Payer: MEDICAID

## 2023-03-24 PROCEDURE — 90834 PSYTX W PT 45 MINUTES: CPT

## 2023-03-24 NOTE — PROGRESS NOTES
612 [facility]                Progress Note    [x] Kasey  [] Nicole zee                    Patient Name: Oumou Cabello II   : 1990     Case # :  [id number]  Therapist: Isha León [facility]        Objective/Service/Time:    1-HOUR    S:  Client completed individual session. He reports using cocaine \"I've been stressed out about court, my daughter's birthday and no getting this job. \" \"I have court on 23 at 9:00 am\" \"I have orientation with Jaswant Alcaraz on 3/30/23 but I really don't like it but I had to get something to hold me over. \" Reviewed March UDS results ([email or web address] and [email or web address]). Client reports being under a lot of stress with legal issues, looking for employment and he's daughter's birthday coming up and having no money. He stated, \"I've just been down and not motivated! \"      O:  Client was cooperative, fully oriented, and dressed appropriately. A:  Client reviewed hand-out and he identified steps to take for positive wellbeing: Self-talk be kind to yourself, Exercise regularly, Do something fun with my daughter, help others, and balance sleep. He will continue working toward completing his Individualized Treatment Plan goals and objectives. P:  Client's next individual session focus mood disorders and substance use. He is committed to attending group and individual therapy sessions weekly. Client needs to follow-up with [facility] for mental health services.                   Jimenez Martinez MA, [facility], , 3:34 PM

## 2023-03-28 ENCOUNTER — HOSPITAL ENCOUNTER (OUTPATIENT)
Dept: PSYCHIATRY | Age: 33
Setting detail: THERAPIES SERIES
Discharge: HOME | End: 2023-03-28
Payer: MEDICAID

## 2023-03-28 PROCEDURE — 90853 GROUP PSYCHOTHERAPY: CPT

## 2023-03-28 NOTE — GROUP NOTE
612 [facility] Group Therapy Note      3/28/2023    Location:  [employer]      Clients Presents: 4470, 4184, 1363, 1382, 1380, 1422, 1426    Clients Absent: 9042, 1388       Length of session: 1.5 hours    Group Note: OP    Group Type: Co-Ed    New members were welcomed and introduced. Norms and expectations of group were discussed. Content: Understanding Techniques and Coping Skills of Anger Management       BRYCE Linares-RUCHI  5/71/0939 3:17 PM      Co-Therapist: N/A      [facility] Individual Group Progress Note    Xochilt Bates II  1990  3/28/2023    Notes on Client Progress in Group        Florentin Morgan attended group, introduced himself and events leading to arrival: presented his check in information: process the importance of compromise, listening and being sober.          BRYCE Linares-RUCHI  6/75/4301 7:98 PM    Co-Therapist: N/A

## 2023-03-31 ENCOUNTER — HOSPITAL ENCOUNTER (OUTPATIENT)
Dept: PSYCHIATRY | Age: 33
Setting detail: THERAPIES SERIES
Discharge: HOME | End: 2023-03-31
Payer: MEDICAID

## 2023-03-31 PROCEDURE — 90834 PSYTX W PT 45 MINUTES: CPT

## 2023-03-31 NOTE — PROGRESS NOTES
612 [facility] TREATMENT PLAN      Location: [x] [place] [] [facility]    Treatment plan:  ANXSDEO  1642  (3/31/23)    Strengths: Hard Working, Provider, Helpful    Weakness/Limitations: Being more punctual    Service/Frequency/Duration: Individual 1x Week in 90 Days, Group assigned 1x Week in 90 Days, Urinalysis Random in 90 Days, AA/NA 6 in 90 Days, Sponsor As Needed in 90 Days and Case Management As Needed in 90 Days    Diagnosis:  F10.20 Other and unspecified alcohol dependence/unspecified drinking behavior and F12.20 Cannabis dependence-unspecified use    Level of Care: 1 Outpatient Services    Problem: Obtained a possession charge, has history of substance use, and a pending court date  Goal: Abstain from using alcohol in 90 Days   Objectives:   1) Identify 3 Indicators of Addiction in 90 Days Evaluation Date: 4/28/23 Code: Achieved 3/24/23 Loss of Control, Need and Compulsion, Continued Use Despite Adverse Consequences    2) Identify 3 Negative Effects of Substance Use in 90 Days Evaluation Date: 4/28/23 Code: Achieved  3/10/23  Lazy, Cloudy Mind, Poor Judgement  3) Identify 3 Ways Staying Clean could positively impact your life in 80 Days Evaluation Date: 4/28/23 Code: C Continue TBD    2. Problem: Lacks Coping Skills to Maintain Long-term Sobriety   Goal: Acquire necessary skills to maintain sobriety in 90 Days   Objectives:   1) Identify 3 Emotions that may trigger Anger in 90 Days Evaluation Date: 4/28/23 Code: Achieved 3/17/23 threatened, stress, hurt  2) Identify 3 External/Internal Triggers and Sober Responses to them in 90 Days Evaluation Date: 4/28/23 Code: C Continue TBD   3) 1x Month contact with [facility]  for support in 90 Days Evaluation Date:  4/28/23 Code: Achieved 2/17/23 Provided list of affordable housing apartments/landlords, List of pantries, and signed an RONNELL for the [address] to obtain a PCP to address his depression    3.     Problem: Lacks Understanding and Knowledge of Addiction

## 2023-03-31 NOTE — PROGRESS NOTES
[address] [facility]                Progress Note    [x] Kasey  [] Nicole zee                    Patient Name: Suresh De La Cruz II   : 1990     Case # :  [id number]  Therapist: BRYCE Zapata        Objective/Service/Time:    1-HOUR    GOAL 3a  OBJECTIVE 1    S:  Client completed individual session. He reports just having some drinks of Tequila. He stated, \"I haven't smoked for the past two weeks. \" \"I got a job at [employer]! \" Kelsy Basia called me back after our session and I will start training on Monday! \"      O:  Client was cooperative, fully oriented, and happy that he obtained a job. A:  Client reviewed ITP and verbalized 3 ways to get back on track after a Lapse/Relapse: Admit you had one, Ask for help, Look at it as an opportunity to grow, Develop coping skills to stay on the recovery track. He will continue working toward completing his Individualized Treatment Plan goals and objectives. P:  Client's next individual session will focus on strategies to maintain sobriety. He is committed to attending group and individual therapy sessions weekly.                   Álvaro Sinclair MA, [facility], , 8:27 PM

## 2023-04-04 ENCOUNTER — HOSPITAL ENCOUNTER (OUTPATIENT)
Dept: PSYCHIATRY | Age: 33
Setting detail: THERAPIES SERIES
Discharge: HOME | End: 2023-04-04
Payer: MEDICAID

## 2023-04-04 PROCEDURE — 90853 GROUP PSYCHOTHERAPY: CPT

## 2023-04-04 NOTE — GROUP NOTE
612 [facility] Group Therapy Note      4/4/2023    Location:  [place]      Clients Presents: 4303, 1328, 1388, 1375, 1382, 1380, 1426    Clients Absent: 1422    Length of session: 1.5 hours    Group Note: OP    Group Type: Co-Ed    New members were welcomed and introduced. Norms and expectations of group were discussed. Content: Understanding Defense Mechanisms: Relapse and Substance Use         BONILLA Floyd  5/7/6269 4:06 PM      Co-Therapist: N/A      [facility] Progress Note    Sharon Saldaña II  1990 4/4/2023    Notes on Client Progress in Group      Karlos Evnas attended group: introduced himself and events leading to arrival: presented check in information : expressed history of rage: identified severe denial and projection.              BONILLA Floyd  5/0/0863 3:84 PM        Co-Therapist: N/A

## 2023-04-06 ENCOUNTER — HOSPITAL ENCOUNTER (OUTPATIENT)
Dept: PSYCHIATRY | Age: 33
Setting detail: THERAPIES SERIES
Discharge: HOME | End: 2023-04-06
Payer: MEDICAID

## 2023-04-06 PROCEDURE — 90832 PSYTX W PT 30 MINUTES: CPT

## 2023-04-06 PROCEDURE — 90834 PSYTX W PT 45 MINUTES: CPT

## 2023-04-06 NOTE — PROGRESS NOTES
[facility]                Progress Note    [x] Kasey  [] Kashmir Murray                    Patient Name: Cornell Aquino II   : 1990     Case # :  [id number]  Therapist: BRYCE Rossi        Objective/Service/Time:    1-HOUR    GOAL 1a  OBJECTIVE 3    S:  Client completed individual session. He reports smoking a couple of joints. She stated, \"I completed my orientation Tuesday and my first full day was yesterday! \" Client continues making progress in finding employment and managing his schedule. He stated, \"Tomorrow we'll be celebrating my daughter's birthday and I took her [place] the [place]. \"      O:  Client was cooperative, fully oriented, and dressed appropriately. A:  Client reviewed ITP and verbalized 3 ways to stay clean: Stay away from people who use, Manage my stress better (exercise-take a walk), Continue working. He will continue working toward completing his Individualized Treatment Plan goals and objectives. P:  Client's next individual session will focus on strategies to manage emotions. He is committed to attending group and individual therapy sessions weekly.                   Elroy Flor MA, [facility], , 7:03 PM

## 2023-04-06 NOTE — PROGRESS NOTES
612 [facility] TREATMENT PLAN      Location: [x] [place] [] Nicole zee    Treatment plan:  BTHNLLA  2581  (4/6/23)    Strengths: Hard Working, Provider, Helpful    Weakness/Limitations: Being more punctual    Service/Frequency/Duration: Individual 1x Week in 90 Days, Group assigned 1x Week in 90 Days, Urinalysis Random in 90 Days, AA/NA 6 in 90 Days, Sponsor As Needed in 90 Days and Case Management As Needed in 90 Days    Diagnosis:  F10.20 Other and unspecified alcohol dependence/unspecified drinking behavior and F12.20 Cannabis dependence-unspecified use    Level of Care: 1 Outpatient Services    Problem: Obtained a possession charge, has history of substance use, and a pending court date  Goal: Abstain from using alcohol in 90 Days   Objectives:   1) Identify 3 Indicators of Addiction in 90 Days Evaluation Date: 4/28/23 Code: Achieved 3/24/23 Loss of Control, Need and Compulsion, Continued Use Despite Adverse Consequences    2) Identify 3 Negative Effects of Substance Use in 90 Days Evaluation Date: 4/28/23 Code: Achieved  3/10/23  Lazy, Cloudy Mind, Poor Judgement  3) Identify 3 Ways Staying Clean could positively impact your life in 80 Days Evaluation Date: 4/28/23 Code: Achieved 4/6/23  Stay away from people who use, Manage my stress better (exercise-take a walk), Continue working    2.     Problem: Lacks Coping Skills to Maintain Long-term Sobriety   Goal: Acquire necessary skills to maintain sobriety in 90 Days   Objectives:   1) Identify 3 Emotions that may trigger Anger in 90 Days Evaluation Date: 4/28/23 Code: Achieved 3/17/23 threatened, stress, hurt  2) Identify 3 External/Internal Triggers and Sober Responses to them in 90 Days Evaluation Date: 4/28/23 Code: C Continue TBD   3) 1x Month contact with REACH  for support in 90 Days Evaluation Date:  4/28/23 Code: Achieved 2/17/23 Provided list of affordable housing apartments/landlords, List of pantries, and signed an RONNELL for the [address] to obtain a

## 2023-04-07 ENCOUNTER — APPOINTMENT (OUTPATIENT)
Dept: PSYCHIATRY | Age: 33
End: 2023-04-07
Payer: MEDICAID

## 2023-04-14 ENCOUNTER — APPOINTMENT (OUTPATIENT)
Dept: PSYCHIATRY | Age: 33
End: 2023-04-14
Payer: MEDICAID

## 2023-04-18 ENCOUNTER — HOSPITAL ENCOUNTER (OUTPATIENT)
Dept: PSYCHIATRY | Age: 33
Setting detail: THERAPIES SERIES
Discharge: HOME | End: 2023-04-18
Payer: MEDICAID

## 2023-04-20 ENCOUNTER — HOSPITAL ENCOUNTER (OUTPATIENT)
Dept: PSYCHIATRY | Age: 33
Setting detail: THERAPIES SERIES
Discharge: HOME | End: 2023-04-20
Payer: MEDICAID

## 2023-04-20 PROCEDURE — 90834 PSYTX W PT 45 MINUTES: CPT

## 2023-04-20 PROCEDURE — 80305 DRUG TEST PRSMV DIR OPT OBS: CPT

## 2023-04-20 NOTE — PROGRESS NOTES
612 [facility]                Progress Note    [x] Kasey  [] Nicole park                    Patient Name: Philomena Cuellar II   : 1990     Case # :  [id number]  Therapist: Robson León [facility]        Objective/Service/Time:    1-HOUR        S:  Client completed individual session. He reports taking two shots of liquor after finding out that a good friend  after having a seizure. He stated, \"This was unexpected and my friends sister found him after two days,\" \"I haven't smoked in two weeks! \" Client reports that his job is going well and how his daughter's birthday went. O:  Client was cooperative, fully oriented, and dressed appropriately. A:  Client agrees that he is doing better in his recovery and wants to maintain. He will continue working toward completing his Individualized Treatment Plan goals and objectives. P:  Client is committed to attending group and individual therapy sessions weekly.                   Peyton Baxter MA, [facility], 68/71/59, 4:15 PM

## 2023-04-21 ENCOUNTER — APPOINTMENT (OUTPATIENT)
Dept: PSYCHIATRY | Age: 33
End: 2023-04-21
Payer: MEDICAID

## 2023-04-25 ENCOUNTER — HOSPITAL ENCOUNTER (OUTPATIENT)
Dept: PSYCHIATRY | Age: 33
Setting detail: THERAPIES SERIES
Discharge: HOME | End: 2023-04-25
Payer: MEDICAID

## 2023-04-25 PROCEDURE — 90853 GROUP PSYCHOTHERAPY: CPT

## 2023-04-26 NOTE — GROUP NOTE
612 [facility] Group Therapy Note      4/26/2023    Location:  [facility]      Clients [id number], 5628, 0717, 8218, 1426     Clients Absent: 1375      Length of session: 1.5 hours    Group Note: OP    Group Type: Co-Ed    New members were welcomed and introduced. Norms and expectations of group were discussed.       Content: Understanding Anger Management , Coping Skills and Relapse         BONILLA Delacruz  6/29/9102 83:36 PM      Co-Therapist: N/A      [facility] Progress Note    Dev Del Rosario II  1990 4/26/2023    Notes on Client Progress in [address] attended group, introduced himself and events leading to arrival: presented check in information: recognizing the importance of being sober and thinking about consequences         BONILLA Delacruz  5/06/5711 7:66 PM      Co-Therapist: N/A

## 2023-04-27 ENCOUNTER — HOSPITAL ENCOUNTER (OUTPATIENT)
Dept: PSYCHIATRY | Age: 33
Setting detail: THERAPIES SERIES
Discharge: HOME | End: 2023-04-27
Payer: MEDICAID

## 2023-04-27 PROCEDURE — 90834 PSYTX W PT 45 MINUTES: CPT

## 2023-04-27 NOTE — PROGRESS NOTES
612 [facility]                Progress Note    [x] Kasey  [] Nicole zee                    Patient Name: Srikanth Petty II   : 1990     Case # :  [id number]  Therapist: Mel Sullivan        Objective/Service/Time:    1-HOUR        S:  Client completed individual session. He reports no use of any substances. He stated, \"Work is good and I just bought my daughter soft     O:  Client was cooperative, fully oriented, and dressed appropriately. A:  Client agrees that he is doing better in his recovery and wants to maintain. He will continue working toward completing his Individualized Treatment Plan goals and objectives. P:  Client is committed to attending group and individual therapy sessions weekly.                   Sulma Lara MA, [facility], , 8:53 PM

## 2023-04-28 ENCOUNTER — APPOINTMENT (OUTPATIENT)
Dept: PSYCHIATRY | Age: 33
End: 2023-04-28
Payer: MEDICAID

## 2023-05-02 ENCOUNTER — HOSPITAL ENCOUNTER (OUTPATIENT)
Dept: PSYCHIATRY | Age: 33
Setting detail: THERAPIES SERIES
Discharge: HOME | End: 2023-05-02
Payer: MEDICAID

## 2023-05-02 PROCEDURE — 90853 GROUP PSYCHOTHERAPY: CPT

## 2023-05-03 NOTE — GROUP NOTE
612 [facility] Group Therapy Note      5/3/2023    Location:  [facility]      Clients Presents: 6956, [address], [address], 3063, 1382, 1222, 1428, 3158    Clients Absent: 1452, 1422    Length of session: 1.5 hours    Group Note: OP    Group Type: Co-Ed    New members were welcomed and introduced. Norms and expectations of group were discussed. Content: Understanding Empathy Skills: Anger Management: Substance Use       BONILLA Claixto  5/0/0654 3:85 AM      Co-Therapist: N/A      [facility] Progress Note    Lord Baudilio II  1990  5/3/2023    Notes on Client Progress in Group      Antoine Marian attended group, introduced himself and events leading to arrival, presented check in information: able and demonstrated ability: insight his actions and decisions on others.          BONILLA Calixto  5/8/5833 27:32 AM        Co-Therapist: N/A

## 2023-05-04 ENCOUNTER — HOSPITAL ENCOUNTER (OUTPATIENT)
Dept: PSYCHIATRY | Age: 33
Setting detail: THERAPIES SERIES
Discharge: HOME | End: 2023-05-04
Payer: MEDICAID

## 2023-05-04 NOTE — PROGRESS NOTES
[facility]                Progress Note    [x] Kasey  [] Genesis Carbone                    Patient Name: Franky Vazquez II   : 1990     Case # :  [id number]  Therapist: BRYCE Fields        Objective/Service/Time:    CX-Client reported being sick. Didn't want to complete telehealth.                       Leticia Aggarwal MA, [facility], , 3:24 PM

## 2023-05-09 ENCOUNTER — HOSPITAL ENCOUNTER (OUTPATIENT)
Dept: PSYCHIATRY | Age: 33
Setting detail: THERAPIES SERIES
Discharge: HOME | End: 2023-05-09
Payer: MEDICAID

## 2023-05-10 NOTE — GROUP NOTE
612 [facility] Group Therapy Note      5/10/2023    Location:  [facility]      Clients Presents: 1950, [address] , [address] [facility], [phone], 1585    Clients Absent: 1382     Length of session: 1.5 hours    Group Note: OP    Group Type: Co-Ed    New members were welcomed and introduced. Norms and expectations of group were discussed.     Content: Understanding Toxic and Dysfunctional Relationship: Copping Skills: Substance Use       BONILLA Albarran  7/84/9842 15:74 PM      Co-Therapist: N/A      [facility] Progress Note    Odonnell Meagan II  1990  5/10/2023    Notes on Client Progress in [address]  1/51/1853 7:37 PM        Co-Therapist: N/A

## 2023-05-11 ENCOUNTER — HOSPITAL ENCOUNTER (OUTPATIENT)
Dept: PSYCHIATRY | Age: 33
Setting detail: THERAPIES SERIES
Discharge: HOME | End: 2023-05-11
Payer: MEDICAID

## 2023-05-11 PROCEDURE — 80305 DRUG TEST PRSMV DIR OPT OBS: CPT

## 2023-05-11 PROCEDURE — 90834 PSYTX W PT 45 MINUTES: CPT

## 2023-05-11 NOTE — PROGRESS NOTES
612 [facility]                Progress Note    [x] [place]  [] [facility]                    Patient Name: Luli Sheffield II   : 1990     Case # :  [id number]  Therapist: Luci León [facility]        Objective/Service/Time:      1-HOUR        S:  Client completed individual session. He report     O:  Client was cooperative, fully oriented, and dressed appropriately. A:  Client agrees that he is doing better in his recovery and wants to maintain. He will continue working toward completing his Individualized Treatment Plan goals and objectives. P:  Client is committed to attending group and individual therapy sessions weekly.                 Tarah Boss MA, [facility], , 8:16 PM

## 2023-05-16 ENCOUNTER — HOSPITAL ENCOUNTER (OUTPATIENT)
Dept: PSYCHIATRY | Age: 33
Setting detail: THERAPIES SERIES
Discharge: HOME | End: 2023-05-16
Payer: MEDICAID

## 2023-05-16 PROCEDURE — 90853 GROUP PSYCHOTHERAPY: CPT

## 2023-05-16 NOTE — GROUP NOTE
612 [facility] Group Therapy Note      5/16/2023    Location:  [place]        Clients Presents: 92Andra, Jose Miguel, [address] [place] [address] Drive, [phone], 7454       Clients Absent: 1422        Length of session: 1.5 hours    Group Note: OP    Group Type: Co-Ed    New members were welcomed and introduced. Norms and expectations of group were discussed. Content: Understanding Cognitive Restructuring: Thoughts, Emotions and Behavior       BONILLA Jane  2/41/1623 0:83 PM        Co-Therapist: N/A      [facility] Progress Note    Jeraline Nyhan II  1990 5/16/2023    Notes on Client Progress in Group        Kennebec Friendly attended group, introduced himself and events leading to arrival: presented check in information: making fools, power play and anger.            BRYCE Jane-RUCHI  6/64/9816 8:91 PM        Co-Therapist: N/A

## 2023-05-18 ENCOUNTER — HOSPITAL ENCOUNTER (OUTPATIENT)
Dept: PSYCHIATRY | Age: 33
Setting detail: THERAPIES SERIES
Discharge: HOME | End: 2023-05-18
Payer: MEDICAID

## 2023-05-18 PROCEDURE — 90834 PSYTX W PT 45 MINUTES: CPT

## 2023-05-18 NOTE — PROGRESS NOTES
612 [facility]                Progress Note    [x] [place]  [] Yariel Ramos                    Patient Name: Brad Díaz II   : 1990     Case # :  [id number]  Therapist: BRYCE Patel        Objective/Service/Time:    1-HOUR    GOAL 2a  OBJECTIVE 2        S:  Client completed individual session. He reports no use of any substances. He stated, \"My daughter won her first softball game! \" \"Yea, she was excited about it and so was I! \" Client reported going to the ER-at the hospital after having chronic back spasms. He stated that he felt better today and was given muscle relaxer from the hospital.      O:  Client was cooperative, fully oriented, and dressed appropriately. A:  Client reviewed a hand-out and identified the stages of recovery: Abstinence Stage, Lifestyle Stage, Self-Knowledge Stage, Spirituality Stage. Client continues working toward completing his Individualized Treatment Plan goals and objectives. P:  Client last group is 23 and his last individual is 23.                     Zenobia Tobin MA, [facility], , 8:97 PM

## 2023-05-18 NOTE — PROGRESS NOTES
612 [facility] TREATMENT PLAN      Location: [x] [place] [] Nicole zee    Treatment plan:  FDZIKVI  8386  (5/18/23)    Strengths: Hard Working, Provider, Helpful    Weakness/Limitations: Being more punctual    Service/Frequency/Duration: Individual 1x Week in 90 Days, Group assigned 1x Week in 90 Days, Urinalysis Random in 90 Days, AA/NA 6 in 90 Days, Sponsor As Needed in 90 Days and Case Management As Needed in 90 Days    Diagnosis:  F10.20 Other and unspecified alcohol dependence/unspecified drinking behavior and F12.20 Cannabis dependence-unspecified use    Level of Care: 1 Outpatient Services    Problem: Obtained a possession charge, has history of substance use, and a pending court date  Goal: Abstain from using alcohol in 90 Days   Objectives:   1) Identify 3 Indicators of Addiction in 90 Days Evaluation Date: 5/28/23 Code: Achieved 3/24/23 Loss of Control, Need and Compulsion, Continued Use Despite Adverse Consequences    2) Identify 3 Negative Effects of Substance Use in 90 Days Evaluation Date: 5/28/23 Code: Achieved  3/10/23  Lazy, Cloudy Mind, Poor Judgement  3) Identify 3 Ways Staying Clean could positively impact your life in 80 Days Evaluation Date: 5/28/23 Code: Achieved 4/6/23  Stay away from people who use, Manage my stress better (exercise-take a walk), Continue working    2.     Problem: Lacks Coping Skills to Maintain Long-term Sobriety   Goal: Acquire necessary skills to maintain sobriety in 90 Days   Objectives:   1) Identify  Emotions that may trigger Anger in 90 Days Evaluation Date: 5/28/23 Code: Achieved 3/17/23 threatened, stress, hurt  2) Identify 4 Stages of Recovery in 90 Days Evaluation Date: 5/28/23 Code: A Achieved  5/18/23  Abstinence Stage, Lifestyle Stage, Self-Knowledge Stage, Spirituality Stage  3) 1x Month contact with REACH  for support in 90 Days Evaluation Date:  5/28/23 Code: Achieved 2/17/23 Provided list of affordable housing apartments/landlords, List of

## 2023-05-23 ENCOUNTER — HOSPITAL ENCOUNTER (OUTPATIENT)
Dept: PSYCHIATRY | Age: 33
Setting detail: THERAPIES SERIES
Discharge: HOME | End: 2023-05-23
Payer: MEDICAID

## 2023-05-23 PROCEDURE — 90853 GROUP PSYCHOTHERAPY: CPT

## 2023-05-24 NOTE — GROUP NOTE
612 [facility] Group Therapy Note      5/24/2023    Location:  [facility]        Clients Presents: [phone], 9786    Clients Absent: 0902, 1451, 1422, 1426    Length of session: 1.5 hours    Group Note: OP    Group Type: Co-Ed    New members were welcomed and introduced. Norms and expectations of group were discussed.         Content: Understanding Conflict Resolution, Communication: Control Issues and Substance Use         BONILLA Gracia  0/35/8423 1:43 PM        Co-Therapist: N/A      [facility] Progress Note    Soni Salazar II  1990 5/24/2023    Notes on Client Progress in [address] attended group, introduced himself and events leading to arrival, presented check in information: expressed distrust in relationships           BONILLA Gracia  9/86/2300 6:18 PM        Co-Therapist: N/A

## 2023-05-30 ENCOUNTER — APPOINTMENT (OUTPATIENT)
Dept: PSYCHIATRY | Age: 33
End: 2023-05-30
Payer: MEDICAID

## 2023-06-01 ENCOUNTER — APPOINTMENT (OUTPATIENT)
Dept: PSYCHIATRY | Age: 33
End: 2023-06-01
Payer: MEDICAID

## 2023-06-01 ENCOUNTER — HOSPITAL ENCOUNTER (OUTPATIENT)
Dept: PSYCHIATRY | Age: 33
Setting detail: THERAPIES SERIES
Discharge: HOME | End: 2023-06-01
Payer: MEDICAID

## 2023-06-01 PROCEDURE — 90834 PSYTX W PT 45 MINUTES: CPT

## 2023-06-01 NOTE — PROGRESS NOTES
[facility] Discharge Treatment Plan    Lulú Villaseñor   1990  Case # [id number]    [facility] [place] [facility]. Stresses that I need to monitor  1. Finance   2. Relationship with kids mother   3. Work   4. Paying fines off    B. Major triggers to using alcohol/drugs   1. Being around the wrong people   2. Controlling my emotions   3. Stress     Sober Plan   1. Stay away from the crowd   2. Being around my daughter   3. Staying busy and focus on better things    C. Sobriety Support   1. Friend: Jatin   2. Treatment staff: Aylin   3. Family member: My Mom   3. AA/NA recovery program, sponsor, meetings day/times, daily ready: As Needed    D. Non-using activities  1. Being daughter    2. Working   3. Attend kids softball game    E. Consequences of Drug/Alcohol use  1. Revoke of probation   2. Letting myself down   3. I've wasted my time here in treatment    G. Short term goals to achieve  1. Get my 's license   2. Get some good credit    H. Follow up recommendations  1. Maintain Sobriety   2. Stay with sober support     Lulú Villaseñor  / 1990 has participated in the discharge treatment plan development outlined above on 6/1/2023.      Mel Armijo [place]  7/1/3159/4:88 PM

## 2023-06-01 NOTE — PROGRESS NOTES
[address] [facility]                Progress Note    [x] Kasey  [] Nicole zee                    Patient Name: Varun Valenzuela II   : 1990     Case # :  [id number]  Therapist: Mel Casanova        Objective/Service/Time:    1-HOUR     S:  Client completed last individual session. Reported no use of substances. O:  Client was cooperative and fully oriented. A:  Client has been cooperative in attending and participating in group and individual sessions. He has completed all of his ITP goals and objectives. He completed Satisfaction Survey, Outcome Questionnaire, and Discharge Treatment Plan. P:  Client was recommended to continue to abstain from all MAS and complete requirements for probation. A discharge will be completed.                       Dasia Ernandez MA, [facility], , 1:20 PM

## 2023-06-06 ENCOUNTER — APPOINTMENT (OUTPATIENT)
Dept: PSYCHIATRY | Age: 33
End: 2023-06-06
Payer: MEDICAID

## 2023-06-07 ENCOUNTER — HOSPITAL ENCOUNTER (OUTPATIENT)
Dept: PSYCHIATRY | Age: 33
Discharge: HOME | End: 2023-06-07

## 2023-06-08 ENCOUNTER — APPOINTMENT (OUTPATIENT)
Dept: PSYCHIATRY | Age: 33
End: 2023-06-08
Payer: MEDICAID

## 2023-06-08 NOTE — PROGRESS NOTES
612 [facility] Discharge Summary    Nettie Yap II  1990  Case # [id number]    Location: [x] [place] [] Nicole zee    Admission Date: 1/27/23    Date of last service: 6/1/23    Therapist: Mel Mejia     Last drug screen:  5/11/23    Results: Negative    Diagnosis: F10.20 Other and unspecified alcohol dependence/unspecified drinking behavior, F12.20 Cannabis dependence-unspecified use, F14.10 Nondependent cocaine abuse-unspecified use    PRESENTING PROBLEM:  Drug Possession Charge    Service: Individual 1x Week, Group  1x Week, Urinalysis Random, Support Meetings 1-2 Monthly, Sponsor As Needed, and Case Management As Needed    Level of care at admission:   1 Outpatient Services    Level of care at discharge:  1 Outpatient Services    Client's Outcomes Treatment: Client has been striving to achieve a quality of life that is substance-free on a continuing basis. He has employment and will continue with completing probation recommendations. Service History Appointments: Scheduled-28   Kept-25   Cancelled-2   Did not show-0    Discharge Code: B     Reason for discharge: Completed Treatment Program    Recommendations/Referrals: Maintain Sobriety and Continue with Sober Support    Upon involuntary termination from service, client has received Client Rights as to their right to file an appeal.    Adult And Adolescent Discharge Level of Care Criteria to be completed separately See Attachment. Mel Mejia   3/5/1948 / 45:37 PM       Medical Director     ADRY Ortega MD    Signature:

## 2023-06-13 ENCOUNTER — APPOINTMENT (OUTPATIENT)
Dept: PSYCHIATRY | Age: 33
End: 2023-06-13
Payer: MEDICAID

## 2023-06-15 ENCOUNTER — APPOINTMENT (OUTPATIENT)
Dept: PSYCHIATRY | Age: 33
End: 2023-06-15
Payer: MEDICAID

## 2024-06-27 ENCOUNTER — APPOINTMENT (OUTPATIENT)
Dept: CT IMAGING | Age: 34
End: 2024-06-27
Payer: MEDICAID

## 2024-06-27 ENCOUNTER — HOSPITAL ENCOUNTER (EMERGENCY)
Age: 34
Discharge: HOME | End: 2024-06-27
Attending: EMERGENCY MEDICINE
Payer: MEDICAID

## 2024-06-27 VITALS
RESPIRATION RATE: 16 BRPM | HEART RATE: 70 BPM | DIASTOLIC BLOOD PRESSURE: 77 MMHG | OXYGEN SATURATION: 100 % | TEMPERATURE: 98.6 F | SYSTOLIC BLOOD PRESSURE: 135 MMHG

## 2024-06-27 DIAGNOSIS — R10.9 RIGHT FLANK PAIN: ICD-10-CM

## 2024-06-27 DIAGNOSIS — E86.0 DEHYDRATION: Primary | ICD-10-CM

## 2024-06-27 LAB
ALBUMIN SERPL-MCNC: 4.6 GM/DL (ref 3.4–5)
ALP BLD-CCNC: 55 IU/L (ref 40–128)
ALT SERPL-CCNC: 12 U/L (ref 10–40)
ANION GAP SERPL CALCULATED.3IONS-SCNC: 16 MMOL/L (ref 7–16)
AST SERPL-CCNC: 21 IU/L (ref 15–37)
BACTERIA: NEGATIVE /HPF
BASOPHILS ABSOLUTE: 0 K/CU MM
BASOPHILS RELATIVE PERCENT: 0.2 % (ref 0–1)
BILIRUB SERPL-MCNC: 0.5 MG/DL (ref 0–1)
BILIRUBIN, URINE: NEGATIVE MG/DL
BLOOD, URINE: ABNORMAL
BUN SERPL-MCNC: 10 MG/DL (ref 6–23)
CALCIUM SERPL-MCNC: 8.9 MG/DL (ref 8.3–10.6)
CHLORIDE BLD-SCNC: 100 MMOL/L (ref 99–110)
CLARITY, UA: CLEAR
CO2: 21 MMOL/L (ref 21–32)
COLOR, UA: YELLOW
CREAT SERPL-MCNC: 0.8 MG/DL (ref 0.9–1.3)
DIFFERENTIAL TYPE: ABNORMAL
EOSINOPHILS ABSOLUTE: 0.1 K/CU MM
EOSINOPHILS RELATIVE PERCENT: 0.7 % (ref 0–3)
GFR, ESTIMATED: >90 ML/MIN/1.73M2
GLUCOSE SERPL-MCNC: 121 MG/DL (ref 70–99)
GLUCOSE URINE: NEGATIVE MG/DL
HCT VFR BLD CALC: 49.1 % (ref 42–52)
HEMOGLOBIN: 16.9 GM/DL (ref 13.5–18)
IMMATURE NEUTROPHIL %: 0.2 % (ref 0–0.43)
KETONES, URINE: 40 MG/DL
LEUKOCYTE ESTERASE, URINE: NEGATIVE
LIPASE: 48 IU/L (ref 13–60)
LYMPHOCYTES ABSOLUTE: 1.8 K/CU MM
LYMPHOCYTES RELATIVE PERCENT: 21.4 % (ref 24–44)
MCH RBC QN AUTO: 33.7 PG (ref 27–31)
MCHC RBC AUTO-ENTMCNC: 34.4 % (ref 32–36)
MCV RBC AUTO: 97.8 FL (ref 78–100)
MONOCYTES ABSOLUTE: 0.6 K/CU MM
MONOCYTES RELATIVE PERCENT: 7.6 % (ref 0–4)
MUCUS: ABNORMAL HPF
NEUTROPHILS ABSOLUTE: 5.8 K/CU MM
NEUTROPHILS RELATIVE PERCENT: 69.9 % (ref 36–66)
NITRITE URINE, QUANTITATIVE: NEGATIVE
NUCLEATED RBC %: 0 %
PDW BLD-RTO: 13.2 % (ref 11.7–14.9)
PH, URINE: 6 (ref 5–8)
PLATELET # BLD: 253 K/CU MM (ref 140–440)
PMV BLD AUTO: 9.8 FL (ref 7.5–11.1)
POTASSIUM SERPL-SCNC: 4 MMOL/L (ref 3.5–5.1)
PROTEIN UA: NEGATIVE MG/DL
RBC # BLD: 5.02 M/CU MM (ref 4.6–6.2)
RBC URINE: 3 /HPF (ref 0–3)
SODIUM BLD-SCNC: 137 MMOL/L (ref 135–145)
SPECIFIC GRAVITY UA: >1.03 (ref 1–1.03)
SQUAMOUS EPITHELIAL: 1 /HPF
TOTAL IMMATURE NEUTOROPHIL: 0.02 K/CU MM
TOTAL NUCLEATED RBC: 0 K/CU MM
TOTAL PROTEIN: 8.2 GM/DL (ref 6.4–8.2)
TRICHOMONAS: ABNORMAL /HPF
UROBILINOGEN, URINE: 1 MG/DL (ref 0.2–1)
WBC # BLD: 8.2 K/CU MM (ref 4–10.5)
WBC UA: 3 /HPF (ref 0–2)

## 2024-06-27 PROCEDURE — 6360000002 HC RX W HCPCS: Performed by: EMERGENCY MEDICINE

## 2024-06-27 PROCEDURE — 74176 CT ABD & PELVIS W/O CONTRAST: CPT

## 2024-06-27 PROCEDURE — 96374 THER/PROPH/DIAG INJ IV PUSH: CPT

## 2024-06-27 PROCEDURE — 99284 EMERGENCY DEPT VISIT MOD MDM: CPT

## 2024-06-27 PROCEDURE — 81001 URINALYSIS AUTO W/SCOPE: CPT

## 2024-06-27 PROCEDURE — 83690 ASSAY OF LIPASE: CPT

## 2024-06-27 PROCEDURE — 80053 COMPREHEN METABOLIC PANEL: CPT

## 2024-06-27 PROCEDURE — 2580000003 HC RX 258: Performed by: EMERGENCY MEDICINE

## 2024-06-27 PROCEDURE — 96361 HYDRATE IV INFUSION ADD-ON: CPT

## 2024-06-27 PROCEDURE — 85025 COMPLETE CBC W/AUTO DIFF WBC: CPT

## 2024-06-27 RX ORDER — KETOROLAC TROMETHAMINE 15 MG/ML
15 INJECTION, SOLUTION INTRAMUSCULAR; INTRAVENOUS ONCE
Status: COMPLETED | OUTPATIENT
Start: 2024-06-27 | End: 2024-06-27

## 2024-06-27 RX ORDER — 0.9 % SODIUM CHLORIDE 0.9 %
1000 INTRAVENOUS SOLUTION INTRAVENOUS ONCE
Status: COMPLETED | OUTPATIENT
Start: 2024-06-27 | End: 2024-06-27

## 2024-06-27 RX ADMIN — KETOROLAC TROMETHAMINE 15 MG: 15 INJECTION, SOLUTION INTRAMUSCULAR; INTRAVENOUS at 13:57

## 2024-06-27 RX ADMIN — SODIUM CHLORIDE 1000 ML: 9 INJECTION, SOLUTION INTRAVENOUS at 14:17

## 2024-06-27 ASSESSMENT — PAIN DESCRIPTION - LOCATION
LOCATION: FLANK

## 2024-06-27 ASSESSMENT — PAIN DESCRIPTION - DESCRIPTORS
DESCRIPTORS: ACHING
DESCRIPTORS: SORE

## 2024-06-27 ASSESSMENT — PAIN SCALES - GENERAL
PAINLEVEL_OUTOF10: 6

## 2024-06-27 ASSESSMENT — PAIN - FUNCTIONAL ASSESSMENT
PAIN_FUNCTIONAL_ASSESSMENT: 0-10
PAIN_FUNCTIONAL_ASSESSMENT: PREVENTS OR INTERFERES SOME ACTIVE ACTIVITIES AND ADLS
PAIN_FUNCTIONAL_ASSESSMENT: 0-10

## 2024-06-27 ASSESSMENT — PAIN DESCRIPTION - ORIENTATION
ORIENTATION: RIGHT;LEFT
ORIENTATION: RIGHT;LEFT

## 2024-07-01 NOTE — ED PROVIDER NOTES
is feeling better after intervention in the ED.  Given his reassuring workup, I think patient is appropriate for outpatient management. Patient is given instructions regarding symptomatic care at home as well as return precautions. To call PCP for follow up in 2-3 days. Patient verbalizes understanding of all instructions and is comfortable with the plan of care. They are discharged in stable condition.      I am the Primary Clinician of Record.    Final Impression:  1. Dehydration    2. Right flank pain      DISPOSITION Decision To Discharge 06/27/2024 04:35:50 PM      Patient referred to:  Megan Marin PA-C [address] [place] [address]  [phone]    Schedule an appointment as soon as possible for a visit in 2 days      [facility]  [address]  [place] [address]  [phone]    If symptoms worsen    Discharge medications:  Discharge Medication List as of 6/27/2024  4:24 PM        (Please note that portions of this note may have been completed with a voice recognition program. Efforts were made to edit the dictations but occasionally words are mis-transcribed.)    DO Deidre Beltrán Jenny L, DO  07/01/24 1056

## 2024-11-25 ENCOUNTER — HOSPITAL ENCOUNTER (EMERGENCY)
Age: 34
Discharge: HOME | End: 2024-11-25
Payer: MEDICAID

## 2024-11-25 VITALS
HEIGHT: 72 IN | OXYGEN SATURATION: 97 % | HEART RATE: 77 BPM | RESPIRATION RATE: 17 BRPM | BODY MASS INDEX: 24.38 KG/M2 | WEIGHT: 180 LBS | SYSTOLIC BLOOD PRESSURE: 144 MMHG | TEMPERATURE: 97.8 F | DIASTOLIC BLOOD PRESSURE: 94 MMHG

## 2024-11-25 DIAGNOSIS — L02.01 FACIAL ABSCESS: Primary | ICD-10-CM

## 2024-11-25 PROCEDURE — 10160 PNXR ASPIR ABSC HMTMA BULLA: CPT

## 2024-11-25 PROCEDURE — 99285 EMERGENCY DEPT VISIT HI MDM: CPT

## 2024-11-25 PROCEDURE — 10060 I&D ABSCESS SIMPLE/SINGLE: CPT

## 2024-11-25 RX ORDER — SULFAMETHOXAZOLE AND TRIMETHOPRIM 800; 160 MG/1; MG/1
1 TABLET ORAL 2 TIMES DAILY
Qty: 14 TABLET | Refills: 0 | Status: SHIPPED | OUTPATIENT
Start: 2024-11-25 | End: 2024-12-02

## 2024-11-25 ASSESSMENT — PAIN - FUNCTIONAL ASSESSMENT: PAIN_FUNCTIONAL_ASSESSMENT: NONE - DENIES PAIN

## 2024-11-25 NOTE — ED PROVIDER NOTES
[facility] EMERGENCY DEPARTMENT  EMERGENCY DEPARTMENT ENCOUNTER        Pt Name: Presley Earl II  MRN: [id number]  Birthdate 1990  Date of evaluation: 11/25/2024  Provider: Presley Szymanski PA-C  PCP: No primary care provider on file.  Note Started: 11:16 AM EST 11/25/24      ILA. I have evaluated this patient.        CHIEF COMPLAINT       Chief Complaint   Patient presents with    Abscess     Abscess next to the right eye        HISTORY OF PRESENT ILLNESS: 1 or more Elements     History From: pt    Presley Earl II is a 34 y.o. male who presents complaining of facial abscess x 1 week.  Patient reports near his right eye he has a abscess it has been progressively worsening.  No drainage, has not poked it with anything, did attempt to squeeze it without improvement.  No current antibiotics.  Denies eye pain, vision change.    Nursing Notes were all reviewed and agreed with or any disagreements were addressed in the HPI.    REVIEW OF SYSTEMS :      Review of Systems   All other systems reviewed and are negative.      Positives and Pertinent negatives as per HPI.       PAST MEDICAL HISTORY    has a past medical history of Allergic rhinitis, Chronic bilateral low back pain with left-sided sciatica, H/O exercise stress test (09/25/2018), History of Holter monitoring (09/20/2018), Tobacco abuse (1/19/2018), and Ulcer (2011).     SURGICAL HISTORY     Past Surgical History:   Procedure Laterality Date    ABDOMEN SURGERY  2011    ulcer       CURRENTMEDICATIONS       Discharge Medication List as of 11/25/2024 10:44 AM        CONTINUE these medications which have NOT CHANGED    Details   meloxicam (MOBIC) 7.5 MG tablet Take 1 tablet by mouth in the morning and 1 tablet in the evening., Disp-30 tablet, R-0Normal      acetaminophen (AMINOFEN) 325 MG tablet Take 2 tablets by mouth every 6 hours as needed for Pain, Disp-40 tablet, R-0Normal      diphenhydrAMINE (BENADRYL)